# Patient Record
Sex: FEMALE | Race: WHITE | Employment: OTHER | URBAN - METROPOLITAN AREA
[De-identification: names, ages, dates, MRNs, and addresses within clinical notes are randomized per-mention and may not be internally consistent; named-entity substitution may affect disease eponyms.]

---

## 2017-03-23 ENCOUNTER — TELEPHONE (OUTPATIENT)
Dept: INTERNAL MEDICINE CLINIC | Age: 77
End: 2017-03-23

## 2017-03-23 ENCOUNTER — OFFICE VISIT (OUTPATIENT)
Dept: INTERNAL MEDICINE CLINIC | Age: 77
End: 2017-03-23

## 2017-03-23 VITALS
HEART RATE: 97 BPM | WEIGHT: 179.2 LBS | SYSTOLIC BLOOD PRESSURE: 128 MMHG | BODY MASS INDEX: 31.75 KG/M2 | TEMPERATURE: 98.1 F | RESPIRATION RATE: 18 BRPM | DIASTOLIC BLOOD PRESSURE: 81 MMHG | OXYGEN SATURATION: 94 % | HEIGHT: 63 IN

## 2017-03-23 DIAGNOSIS — H46.9 OPTIC NEUROPATHY, RIGHT: ICD-10-CM

## 2017-03-23 DIAGNOSIS — E03.9 ACQUIRED HYPOTHYROIDISM: ICD-10-CM

## 2017-03-23 DIAGNOSIS — I10 ESSENTIAL HYPERTENSION: Primary | ICD-10-CM

## 2017-03-23 DIAGNOSIS — M85.80 OSTEOPENIA: ICD-10-CM

## 2017-03-23 DIAGNOSIS — L98.9 SKIN LESIONS: ICD-10-CM

## 2017-03-23 DIAGNOSIS — E78.00 PURE HYPERCHOLESTEROLEMIA: ICD-10-CM

## 2017-03-23 DIAGNOSIS — R73.09 ELEVATED GLUCOSE: ICD-10-CM

## 2017-03-23 DIAGNOSIS — Z78.0 POSTMENOPAUSAL STATE: ICD-10-CM

## 2017-03-23 RX ORDER — GUAIFENESIN 100 MG/5ML
81 LIQUID (ML) ORAL DAILY
COMMUNITY

## 2017-03-23 RX ORDER — PERPHENAZINE 16 MG
TABLET ORAL
COMMUNITY
End: 2018-05-21

## 2017-03-23 RX ORDER — LANOLIN ALCOHOL/MO/W.PET/CERES
400 CREAM (GRAM) TOPICAL DAILY
COMMUNITY

## 2017-03-23 RX ORDER — SERTRALINE HYDROCHLORIDE 100 MG/1
TABLET, FILM COATED ORAL DAILY
COMMUNITY
End: 2017-03-23 | Stop reason: SDUPTHER

## 2017-03-23 RX ORDER — HYDRALAZINE HYDROCHLORIDE 25 MG/1
25 TABLET, FILM COATED ORAL 3 TIMES DAILY
Qty: 270 TAB | Refills: 3 | Status: SHIPPED | OUTPATIENT
Start: 2017-03-23 | End: 2018-04-02 | Stop reason: SDUPTHER

## 2017-03-23 RX ORDER — LORAZEPAM 1 MG/1
TABLET ORAL
Qty: 90 TAB | Refills: 0 | Status: SHIPPED | OUTPATIENT
Start: 2017-03-23 | End: 2017-07-11 | Stop reason: SDUPTHER

## 2017-03-23 RX ORDER — LOSARTAN POTASSIUM AND HYDROCHLOROTHIAZIDE 25; 100 MG/1; MG/1
1 TABLET ORAL DAILY
COMMUNITY
End: 2017-03-23 | Stop reason: SDUPTHER

## 2017-03-23 RX ORDER — ASPIRIN 81 MG
TABLET, DELAYED RELEASE (ENTERIC COATED) ORAL
COMMUNITY

## 2017-03-23 RX ORDER — ATORVASTATIN CALCIUM 40 MG/1
TABLET, FILM COATED ORAL DAILY
COMMUNITY
End: 2017-03-23 | Stop reason: SDUPTHER

## 2017-03-23 RX ORDER — PANTOPRAZOLE SODIUM 40 MG/1
40 TABLET, DELAYED RELEASE ORAL DAILY
Qty: 90 TAB | Refills: 3 | Status: SHIPPED | OUTPATIENT
Start: 2017-03-23 | End: 2018-07-04 | Stop reason: SDUPTHER

## 2017-03-23 RX ORDER — LEVOTHYROXINE SODIUM 112 UG/1
112 TABLET ORAL
Qty: 90 TAB | Refills: 3 | Status: SHIPPED | OUTPATIENT
Start: 2017-03-23 | End: 2018-02-26 | Stop reason: SDUPTHER

## 2017-03-23 RX ORDER — HYDRALAZINE HYDROCHLORIDE 25 MG/1
25 TABLET, FILM COATED ORAL 3 TIMES DAILY
COMMUNITY
End: 2017-03-23 | Stop reason: SDUPTHER

## 2017-03-23 RX ORDER — PANTOPRAZOLE SODIUM 40 MG/1
40 TABLET, DELAYED RELEASE ORAL DAILY
COMMUNITY
End: 2017-03-23 | Stop reason: SDUPTHER

## 2017-03-23 RX ORDER — LANOLIN ALCOHOL/MO/W.PET/CERES
1000 CREAM (GRAM) TOPICAL DAILY
COMMUNITY

## 2017-03-23 RX ORDER — LORAZEPAM 1 MG/1
1 TABLET ORAL
COMMUNITY
Start: 2017-01-12 | End: 2017-03-23 | Stop reason: SDUPTHER

## 2017-03-23 RX ORDER — LEVOTHYROXINE SODIUM 112 UG/1
TABLET ORAL
COMMUNITY
Start: 2017-02-28 | End: 2017-03-23 | Stop reason: SDUPTHER

## 2017-03-23 RX ORDER — TRAMADOL HYDROCHLORIDE 50 MG/1
50 TABLET ORAL
COMMUNITY
End: 2018-05-21

## 2017-03-23 RX ORDER — LOSARTAN POTASSIUM AND HYDROCHLOROTHIAZIDE 25; 100 MG/1; MG/1
1 TABLET ORAL DAILY
Qty: 90 TAB | Refills: 3 | Status: SHIPPED | OUTPATIENT
Start: 2017-03-23 | End: 2018-04-02 | Stop reason: SDUPTHER

## 2017-03-23 RX ORDER — DIPHENHYDRAMINE HCL 25 MG
25 TABLET ORAL
COMMUNITY
End: 2018-11-14

## 2017-03-23 RX ORDER — SERTRALINE HYDROCHLORIDE 100 MG/1
100 TABLET, FILM COATED ORAL DAILY
Qty: 90 TAB | Refills: 3 | Status: SHIPPED | OUTPATIENT
Start: 2017-03-23 | End: 2018-04-02 | Stop reason: SDUPTHER

## 2017-03-23 RX ORDER — ATORVASTATIN CALCIUM 40 MG/1
40 TABLET, FILM COATED ORAL DAILY
Qty: 90 TAB | Refills: 3 | Status: SHIPPED | OUTPATIENT
Start: 2017-03-23 | End: 2018-04-02 | Stop reason: SDUPTHER

## 2017-03-23 NOTE — TELEPHONE ENCOUNTER
Pt would like a return  Phone call from the nurse or a patient  regarding medical records that were going to sent from another physician. Pt wanted to make sure the records were received. Pt can be reached at [phone].        Message received & copied from [facility]

## 2017-03-23 NOTE — ACP (ADVANCE CARE PLANNING)
Advance Care Planning 3/23/2017   Patient's Healthcare Decision Maker is: Legal Next of Kin   Primary Decision Maker Name Dr. Oliverio Main   Primary Decision Maker Phone Number [phone]   Primary Decision Maker Relationship to Patient Adult child   Secondary Decision Maker Name Argriselda Portillo   Secondary Decision Maker Phone Number [phone]   Secondary Decision Maker Relationship to Patient Adult child   Confirm Advance Directive Yes, not on file   Does the patient have other document types Power of [address]

## 2017-03-23 NOTE — PROGRESS NOTES
HPI: Guillermina Fall is a 77 y.o. female presents to establish. 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 Labs November 2016. LDL at goal.  On lipitor 40mg once a day, no myalgias. On sertraline for anxiety and OCD for over 20 years. Taking lorazepam 1mg at bedctime with benadryl 25mg, for the past 5 years. Taking hydralazine 25mg TID and losartan/HCTZ 100/25mg for HTN. BP controlled. Left shoulder pain, 12 years ago injured shoulder and saw ortho. Diagnosed with bursitis. The pain is down left bicep. Prior PT, not helpful. Is in Silver Sneakers, cautious with weights. Taking tramadol infrequently. Occasion Aleve for pain. Taking protonix daily. Has GERD. Occasionally takes second. Trying to follow bland diet. Taking thyroid medication. Prior ablation. Stable weight. Reports xrays show multiple rib fractures, prior bone scans. Some chest wall pain with movement. Thinks it was related to prior deep tissue massage. Prior DEXA, 3 years ago. Will get MORRIS. Mother MI age 41\"s. Prior cardiac workup negative. Prior colon screening, 2/4/2013. Normal except diverticular disease and tortous colon recheck 5 years. GM with colon cancer. Some constipation, Magnesium and stool softener routinely. Normal urination except stress incontinence. No DUB, last pap was 2 years ago. Due pap in November. Mammogram in January 2017. Right breast biospy in 2016. Up to date on immunization.         ROS:  Constitutional: negative for fevers, chills, anorexia, weight loss  Eyes:   negative for visual disturbance, irritation  ENT:   negative for tinnitus,sore throat,nasal congestion,ear pain,  Respiratory:  negative for cough, hemoptysis, dyspnea,wheezing  CV:   negative for chest pain, palpitations, lower extremity edema  GI:   negative for nausea, vomiting, diarrhea, abdominal pain,melena  Endo:               negative for polyuria,polydipsia,polyphagia,heat intolerance  Genitourinary: negative for frequency, dysuria, hematuria  Integument:  negative for rash, pruritus  Hematologic:  negative for easy bruising and gum/nose bleeding  Musculoskel: negative for  back pain, muscle weakness, positive for left shoulder pain, chest wall pain  Neurological:  negative for headaches, dizziness, gait problems, numbness  Behavl/Psych: negative for depression, positive for anxiety    Past Medical History:   Diagnosis Date    Anxiety     Chronic pain of both shoulders     Diverticulosis     GERD (gastroesophageal reflux disease)     H/O tubal ligation     Hyperlipidemia     Hypertension     Hypothyroidism     Prediabetes     Vitamin D deficiency      Past Surgical History:   Procedure Laterality Date    HX APPENDECTOMY      HX DILATION AND CURETTAGE  1971-1976    x 3 due to MAB    HX HEENT      HX HEMORRHOIDECTOMY      banded    HX INTRACRANIAL ANEURYSM REPAIR  11/1997    brain aneurysm clipping, [facility]    HX TONSIL AND ADENOIDECTOMY       Social History     Social History    Marital status:      Spouse name: N/A    Number of children: N/A    Years of education: N/A     Social History Main Topics    Smoking status: Former Smoker     Packs/day: 1.00     Years: 30.00     Quit date: 3/23/2007    Smokeless tobacco: None    Alcohol use 4.2 oz/week     7 Glasses of wine per week    Drug use: No    Sexual activity: Not Asked     Other Topics Concern    None     Social History Narrative    None     Family History   Problem Relation Age of Onset    Heart Disease Mother     Stroke Mother     Hypertension Mother     Diabetes Mother     Elevated Lipids Mother     Diabetes Father     Heart Disease Father     Hypertension Father     Elevated Lipids Father     Cancer Sister 36     breast cancer    Sleep Apnea Sister     Elevated Lipids Sister     Heart Surgery Brother     Heart Disease Brother     Cancer Maternal Grandmother      colon cancer    Stroke Maternal Grandfather     Diabetes Paternal Grandmother     Cancer Paternal Grandfather      pancreatic cancer     Current Outpatient Prescriptions   Medication Sig Dispense Refill    aspirin 81 mg chewable tablet Take 81 mg by mouth daily.  diphenhydrAMINE (BENADRYL ALLERGY) 25 mg tablet Take 25 mg by mouth nightly.  docusate sodium (STOOL SOFTENER) 100 mg tab Take  by mouth. 2 tabs PO daily      magnesium oxide (MAG-OX) 400 mg tablet Take 400 mg by mouth daily.  Alpha Lipoic Acid 600 mg cap Take  by mouth.  traMADol (ULTRAM) 50 mg tablet Take 50 mg by mouth every six (6) hours as needed for Pain.  cyanocobalamin (VITAMIN B-12) 1,000 mcg tablet Take 1,000 mcg by mouth daily.  atorvastatin (LIPITOR) 40 mg tablet Take 1 Tab by mouth daily. 90 Tab 3    hydrALAZINE (APRESOLINE) 25 mg tablet Take 1 Tab by mouth three (3) times daily. 270 Tab 3    levothyroxine (SYNTHROID) 112 mcg tablet Take 1 Tab by mouth Daily (before breakfast). 90 Tab 3    LORazepam (ATIVAN) 1 mg tablet Take one tablet at bedtime. 90 Tab 0    losartan-hydroCHLOROthiazide (HYZAAR) 100-25 mg per tablet Take 1 Tab by mouth daily. 90 Tab 3    sertraline (ZOLOFT) 100 mg tablet Take 1 Tab by mouth daily. 90 Tab 3    pantoprazole (PROTONIX) 40 mg tablet Take 1 Tab by mouth daily.  90 Tab 3     Allergies   Allergen Reactions    Adhesive Tape-Silicones Rash and Itching    Percocet [Oxycodone-Acetaminophen] Other (comments)     agitation         Physical exam:  Visit Vitals    /81 (BP 1 Location: Left arm, BP Patient Position: Sitting)    Pulse 97    Temp 98.1 °F (36.7 °C) (Oral)    Resp 18    Ht 5' 3\" (1.6 m)    Wt 179 lb 3.2 oz (81.3 kg)    SpO2 94%    BMI 31.74 kg/m2     General appearance - alert, well appearing, and in no distress  HEENT- PERLL,normal conjunctiva, TM normal bilaterally, mucous membranes moist, pharynx normal without lesions  Neck - supple, no significant adenopathy   Pulm- clear to auscultation, no wheezes, rales or rhonchi  CV- normal rate, regular rhythm, normal S1, S2, no murmurs   Abdomen - soft, nontender, nondistended, no masses or organomegaly  Extrem-peripheral pulses normal, no pedal edema  Neuro -alert, oriented,nonfocal    Assessment/Plan:    1. Optic neuropathy, right    - REFERRAL TO OPHTHALMOLOGY    2. Skin lesions    - REFERRAL TO DERMATOLOGY    3. Essential hypertension- Recommend following a lower sodium diet and regular cardiovascular exercise. Blood pressure goal is less than 130/85 on average. Advised compliance with blood pressure medication and regular follow up. - METABOLIC PANEL, COMPREHENSIVE; Future  - CBC W/O DIFF; Future  - hydrALAZINE (APRESOLINE) 25 mg tablet; Take 1 Tab by mouth three (3) times daily. Dispense: 270 Tab; Refill: 3  - losartan-hydroCHLOROthiazide (HYZAAR) 100-25 mg per tablet; Take 1 Tab by mouth daily. Dispense: 90 Tab; Refill: 3    4. Pure hypercholesterolemia- Recommend AHA diet and regular cardiovascular exercise. Continue statin therapy. - LIPID PANEL; Future  - atorvastatin (LIPITOR) 40 mg tablet; Take 1 Tab by mouth daily. Dispense: 90 Tab; Refill: 3    5. Acquired hypothyroidism- Recommend taking thyroid medication daily on empty stomach and regular follow up.    - TSH 3RD GENERATION; Future  - levothyroxine (SYNTHROID) 112 mcg tablet; Take 1 Tab by mouth Daily (before breakfast). Dispense: 90 Tab; Refill: 3    6. Osteopenia    - DEXA BONE DENSITY STUDY AXIAL; Future    7. Postmenopausal state    - DEXA BONE DENSITY STUDY AXIAL; Future    8. Elevated glucose    - HEMOGLOBIN A1C W/O EAG; Future    Follow-up Disposition:  Return for follow up for annual with pap, Nov 2017.  Nanette Alexander MD

## 2017-03-23 NOTE — TELEPHONE ENCOUNTER
Spoke with patient. Advised I do not see them scanned in her chart already. Will call medical records tomorrow morning to see if they have received her records from [facility]. Patient satisfied with response.

## 2017-03-23 NOTE — MR AVS SNAPSHOT
Visit Information Date & Time Provider Department Dept. Phone Encounter #  
 3/23/2017  1:30 PM Collette Richter, [address] [place] [address],[address] [phone] [id number] Follow-up Instructions Return for follow up for annual with pap, Nov 2017. Jerome Jenkins Upcoming Health Maintenance Date Due DTaP/Tdap/Td series (1 - Tdap) 10/16/1962 ZOSTER VACCINE AGE 60> 10/16/2001 GLAUCOMA SCREENING Q2Y 10/16/2006 OSTEOPOROSIS SCREENING (DEXA) 10/16/2006 Pneumococcal 65+ Low/Medium Risk (1 of 2 - PCV13) 10/16/2006 MEDICARE YEARLY EXAM 10/16/2006 INFLUENZA AGE 9 TO ADULT 8/1/2016 Allergies as of 3/23/2017  Review Complete On: 3/23/2017 By: Collette Richter MD  
  
 Severity Noted Reaction Type Reactions Adhesive Tape-silicones  23/93/0040    Rash, Itching Percocet [Oxycodone-acetaminophen]  03/23/2017    Other (comments) agitation Current Immunizations  Reviewed on 3/23/2017 Name Date Pneumococcal Conjugate (PCV-13) 1/1/2013 Tdap 10/21/2011 Zoster Vaccine, Live 1/1/2011 Reviewed by Collette Richter MD on 3/23/2017 at  2:27 PM  
You Were Diagnosed With   
  
 Codes Comments Essential hypertension    -  Primary ICD-10-CM: I10 
ICD-9-CM: 401.9 Optic neuropathy, right     ICD-10-CM: H46.9 ICD-9-CM: 377.39 Skin lesions     ICD-10-CM: L98.9 ICD-9-CM: 709.9 Pure hypercholesterolemia     ICD-10-CM: E78.00 ICD-9-CM: 272.0 Acquired hypothyroidism     ICD-10-CM: E03.9 ICD-9-CM: 244.9 Osteopenia     ICD-10-CM: M85.80 ICD-9-CM: 733.90 Postmenopausal state     ICD-10-CM: Z78.0 ICD-9-CM: V49.81 Elevated glucose     ICD-10-CM: R73.09 
ICD-9-CM: 790.29 Vitals BP Pulse Temp Resp Height(growth percentile) Weight(growth percentile) 128/81 (BP 1 Location: Left arm, BP Patient Position: Sitting) 97 98.1 °F (36.7 °C) (Oral) 18 5' 3\" (1.6 m) 179 lb 3.2 oz (81.3 kg) SpO2 BMI OB Status Smoking Status 94% 31.74 kg/m2 Postmenopausal Former Smoker Vitals History BMI and BSA Data Body Mass Index Body Surface Area 31.74 kg/m 2 1.9 m 2 Your Updated Medication List  
  
   
This list is accurate as of: 3/23/17  2:48 PM.  Always use your most recent med list.  
  
  
  
  
 Alpha Lipoic Acid 600 mg Cap Take  by mouth. aspirin 81 mg chewable tablet Take 81 mg by mouth daily. atorvastatin 40 mg tablet Commonly known as:  LIPITOR Take 1 Tab by mouth daily. BENADRYL ALLERGY 25 mg tablet Generic drug:  diphenhydrAMINE Take 25 mg by mouth nightly. hydrALAZINE 25 mg tablet Commonly known as:  APRESOLINE Take 1 Tab by mouth three (3) times daily. levothyroxine 112 mcg tablet Commonly known as:  SYNTHROID Take 1 Tab by mouth Daily (before breakfast). LORazepam 1 mg tablet Commonly known as:  ATIVAN Take one tablet at bedtime. losartan-hydroCHLOROthiazide 100-25 mg per tablet Commonly known as:  HYZAAR Take 1 Tab by mouth daily. magnesium oxide 400 mg tablet Commonly known as:  MAG-OX Take 400 mg by mouth daily. pantoprazole 40 mg tablet Commonly known as:  PROTONIX Take 1 Tab by mouth daily. sertraline 100 mg tablet Commonly known as:  ZOLOFT Take 1 Tab by mouth daily. STOOL SOFTENER 100 mg Tab Generic drug:  docusate sodium Take  by mouth. 2 tabs PO daily  
  
 traMADol 50 mg tablet Commonly known as:  ULTRAM  
Take 50 mg by mouth every six (6) hours as needed for Pain. VITAMIN B-12 1,000 mcg tablet Generic drug:  cyanocobalamin Take 1,000 mcg by mouth daily. Prescriptions Printed Refills  
 atorvastatin (LIPITOR) 40 mg tablet 3 Sig: Take 1 Tab by mouth daily. Class: Print Route: Oral  
 hydrALAZINE (APRESOLINE) 25 mg tablet 3 Sig: Take 1 Tab by mouth three (3) times daily. Class: Print  Route: Oral  
 levothyroxine (SYNTHROID) 112 mcg tablet 3 Sig: Take 1 Tab by mouth Daily (before breakfast). Class: Print Route: Oral  
 LORazepam (ATIVAN) 1 mg tablet 0 Sig: Take one tablet at bedtime. Class: Print  
 losartan-hydroCHLOROthiazide (HYZAAR) 100-25 mg per tablet 3 Sig: Take 1 Tab by mouth daily. Class: Print Route: Oral  
 sertraline (ZOLOFT) 100 mg tablet 3 Sig: Take 1 Tab by mouth daily. Class: Print Route: Oral  
 pantoprazole (PROTONIX) 40 mg tablet 3 Sig: Take 1 Tab by mouth daily. Class: Print Route: Oral  
  
We Performed the Following REFERRAL TO DERMATOLOGY [REF19 Custom] Comments:  
 Please evaluate patient for skin cancer REFERRAL TO OPHTHALMOLOGY [REF57 Custom] Comments:  
 Cataracts, history of optic neuropathy right eye, prior aneurysm. Follow-up Instructions Return for follow up for annual with pap, Nov 2017. Keith Schreiber To-Do List   
 03/23/2017 Lab:  CBC W/O DIFF   
  
 03/23/2017 Imaging:  DEXA BONE DENSITY STUDY AXIAL   
  
 03/23/2017 Lab:  HEMOGLOBIN A1C W/O EAG   
  
 03/23/2017 Lab:  LIPID PANEL   
  
 03/23/2017 Lab:  METABOLIC PANEL, COMPREHENSIVE   
  
 03/23/2017 Lab:  TSH 3RD GENERATION Referral Information Referral ID Referred By Referred To  
  
 [id number] Hardin Michael [facility] [address] [facility], [address] Visits Status Start Date End Date 1 Authorized 3/23/17 3/23/18 If your referral has a status of pending review or denied, additional information will be sent to support the outcome of this decision. Referral ID Referred By Referred To  
 [id number] Castro Severino MD  
   [address] [facility] [facility] Phone: [phone] Fax: [phone] Visits Status Start Date End Date 1 Authorized 3/23/17 3/23/18  If your referral has a status of pending review or denied, additional information will be sent to support the outcome of this decision. Introducing [facility]! [employer] introduces [employer] patient portal. Now you can access parts of your medical record, email your doctor's office, and request medication refills online. 1. In your internet browser, go to [email or web address] 2. Click on the First Time User? Click Here link in the Sign In box. You will see the New Member Sign Up page. 3. Enter your [employer] Access Code exactly as it appears below. You will not need to use this code after youve completed the sign-up process. If you do not sign up before the expiration date, you must request a new code. · [employer] Access Code: [email or web address] Expires: 6/21/2017  1:21 PM 
 
4. Enter the last four digits of your Social Security Number (xxxx) and Date of Birth (mm/dd/yyyy) as indicated and click Submit. You will be taken to the next sign-up page. 5. Create a [employer] ID. This will be your [employer] login ID and cannot be changed, so think of one that is secure and easy to remember. 6. Create a [employer] password. You can change your password at any time. 7. Enter your Password Reset Question and Answer. This can be used at a later time if you forget your password. 8. Enter your e-mail address. You will receive e-mail notification when new information is available in [address] 9. Click Sign Up. You can now view and download portions of your medical record. 10. Click the Download Summary menu link to download a portable copy of your medical information. If you have questions, please visit the Frequently Asked Questions section of the [employer] website. Remember, [employer] is NOT to be used for urgent needs. For medical emergencies, dial 911. Now available from your iPhone and Android! Please provide this summary of care documentation to your next provider. Your primary care clinician is listed as Gopal Mccall.  If you have any questions after today's visit, please call [phone].

## 2017-03-29 ENCOUNTER — HOSPITAL ENCOUNTER (OUTPATIENT)
Dept: MAMMOGRAPHY | Age: 77
Discharge: HOME | End: 2017-03-29
Attending: FAMILY MEDICINE
Payer: MEDICARE

## 2017-03-29 DIAGNOSIS — M85.80 OSTEOPENIA: ICD-10-CM

## 2017-03-29 DIAGNOSIS — Z78.0 POSTMENOPAUSAL STATE: ICD-10-CM

## 2017-03-29 PROCEDURE — 77080 DXA BONE DENSITY AXIAL: CPT

## 2017-03-30 ENCOUNTER — TELEPHONE (OUTPATIENT)
Dept: INTERNAL MEDICINE CLINIC | Age: 77
End: 2017-03-30

## 2017-03-31 NOTE — TELEPHONE ENCOUNTER
Spoke with patient. Records are pending review from Dr. Cristine Francis and will be scanned after she reviews them.

## 2017-04-04 NOTE — PROGRESS NOTES
Bone density testing shows osteopenia. Recommend weight bearing exercise, like walking. Calcium 1200mg daily, most from foods, less from supplement. Vitamin D3 2,000 iu once a day. Recheck bone density test in 3 years.

## 2017-04-05 NOTE — PROGRESS NOTES
Reviewed results with patient per Dr. Jordan Bell. I have reviewed the provider's instructions with the patient, answering all questions to her satisfaction.

## 2017-07-11 RX ORDER — LORAZEPAM 1 MG/1
TABLET ORAL
Qty: 90 TAB | Refills: 0 | OUTPATIENT
Start: 2017-07-11 | End: 2017-10-12 | Stop reason: SDUPTHER

## 2017-07-12 NOTE — TELEPHONE ENCOUNTER
Approved Medications  LORazepam (ATIVAN) 1 mg tablet  Take one tablet at bedtime. Disp: 90 Tab Refills: 0    Class: Phone In Start: 7/11/2017   Approved by: Nic Loera MD  Phoned in Ativan 1 mg to [address] per Dr. Canela Clock request. Steven Mesa with Jaylan Adam who gave David Miller.

## 2017-10-13 RX ORDER — LORAZEPAM 1 MG/1
TABLET ORAL
Qty: 90 TAB | Refills: 0 | OUTPATIENT
Start: 2017-10-13 | End: 2018-01-13 | Stop reason: SDUPTHER

## 2017-10-16 NOTE — TELEPHONE ENCOUNTER
Pt states that [facility] did not get script for Lorazepam please call that in again. Thanks.

## 2017-10-16 NOTE — TELEPHONE ENCOUNTER
Approved Medications  LORazepam (ATIVAN) 1 mg tablet  Take one tablet at bedtime.        Disp: 90 Tab Refills: 0    Class: Phone In Start: 10/13/2017   Approved by: Cristine Philip MD  Phoned in Ativan to [facility] per  request. [facility]

## 2017-11-13 ENCOUNTER — HOSPITAL ENCOUNTER (OUTPATIENT)
Dept: LAB | Age: 77
Discharge: HOME | End: 2017-11-13
Payer: MEDICARE

## 2017-11-13 ENCOUNTER — LAB ONLY (OUTPATIENT)
Dept: INTERNAL MEDICINE CLINIC | Age: 77
End: 2017-11-13

## 2017-11-13 DIAGNOSIS — I10 ESSENTIAL HYPERTENSION: Primary | ICD-10-CM

## 2017-11-13 DIAGNOSIS — E03.9 ACQUIRED HYPOTHYROIDISM: ICD-10-CM

## 2017-11-13 DIAGNOSIS — E78.00 PURE HYPERCHOLESTEROLEMIA: ICD-10-CM

## 2017-11-13 DIAGNOSIS — R73.09 ELEVATED GLUCOSE: ICD-10-CM

## 2017-11-13 PROCEDURE — 83036 HEMOGLOBIN GLYCOSYLATED A1C: CPT

## 2017-11-13 PROCEDURE — 36415 COLL VENOUS BLD VENIPUNCTURE: CPT

## 2017-11-13 PROCEDURE — 85025 COMPLETE CBC W/AUTO DIFF WBC: CPT

## 2017-11-13 PROCEDURE — 80061 LIPID PANEL: CPT

## 2017-11-13 PROCEDURE — 80053 COMPREHEN METABOLIC PANEL: CPT

## 2017-11-13 PROCEDURE — 84443 ASSAY THYROID STIM HORMONE: CPT

## 2017-11-14 LAB
ALBUMIN SERPL-MCNC: 4.5 G/DL (ref 3.5–4.8)
ALBUMIN/GLOB SERPL: 2.1 {RATIO} (ref 1.2–2.2)
ALP SERPL-CCNC: 70 IU/L (ref 39–117)
ALT SERPL-CCNC: 26 IU/L (ref 0–32)
AST SERPL-CCNC: 25 IU/L (ref 0–40)
BASOPHILS # BLD AUTO: 0 X10E3/UL (ref 0–0.2)
BASOPHILS NFR BLD AUTO: 1 %
BILIRUB SERPL-MCNC: 0.6 MG/DL (ref 0–1.2)
BUN SERPL-MCNC: 16 MG/DL (ref 8–27)
BUN/CREAT SERPL: 15 (ref 12–28)
CALCIUM SERPL-MCNC: 9.8 MG/DL (ref 8.7–10.3)
CHLORIDE SERPL-SCNC: 100 MMOL/L (ref 96–106)
CHOLEST SERPL-MCNC: 169 MG/DL (ref 100–199)
CO2 SERPL-SCNC: 24 MMOL/L (ref 18–29)
CREAT SERPL-MCNC: 1.04 MG/DL (ref 0.57–1)
EOSINOPHIL # BLD AUTO: 0.2 X10E3/UL (ref 0–0.4)
EOSINOPHIL NFR BLD AUTO: 4 %
ERYTHROCYTE [DISTWIDTH] IN BLOOD BY AUTOMATED COUNT: 13.4 % (ref 12.3–15.4)
EST. AVERAGE GLUCOSE BLD GHB EST-MCNC: 126 MG/DL
GFR SERPLBLD CREATININE-BSD FMLA CKD-EPI: 52 ML/MIN/1.73
GFR SERPLBLD CREATININE-BSD FMLA CKD-EPI: 60 ML/MIN/1.73
GLOBULIN SER CALC-MCNC: 2.1 G/DL (ref 1.5–4.5)
GLUCOSE SERPL-MCNC: 123 MG/DL (ref 65–99)
HBA1C MFR BLD: 6 % (ref 4.8–5.6)
HCT VFR BLD AUTO: 40.6 % (ref 34–46.6)
HDLC SERPL-MCNC: 56 MG/DL
HGB BLD-MCNC: 13.8 G/DL (ref 11.1–15.9)
IMM GRANULOCYTES # BLD: 0 X10E3/UL (ref 0–0.1)
IMM GRANULOCYTES NFR BLD: 0 %
LDLC SERPL CALC-MCNC: 82 MG/DL (ref 0–99)
LDLC/HDLC SERPL: 1.5 RATIO UNITS (ref 0–3.2)
LYMPHOCYTES # BLD AUTO: 2.1 X10E3/UL (ref 0.7–3.1)
LYMPHOCYTES NFR BLD AUTO: 42 %
MCH RBC QN AUTO: 32.5 PG (ref 26.6–33)
MCHC RBC AUTO-ENTMCNC: 34 G/DL (ref 31.5–35.7)
MCV RBC AUTO: 96 FL (ref 79–97)
MONOCYTES # BLD AUTO: 0.4 X10E3/UL (ref 0.1–0.9)
MONOCYTES NFR BLD AUTO: 9 %
NEUTROPHILS # BLD AUTO: 2.3 X10E3/UL (ref 1.4–7)
NEUTROPHILS NFR BLD AUTO: 44 %
PLATELET # BLD AUTO: 286 X10E3/UL (ref 150–379)
POTASSIUM SERPL-SCNC: 4.1 MMOL/L (ref 3.5–5.2)
PROT SERPL-MCNC: 6.6 G/DL (ref 6–8.5)
RBC # BLD AUTO: 4.24 X10E6/UL (ref 3.77–5.28)
SODIUM SERPL-SCNC: 142 MMOL/L (ref 134–144)
TRIGL SERPL-MCNC: 156 MG/DL (ref 0–149)
TSH SERPL DL<=0.005 MIU/L-ACNC: 2.67 UIU/ML (ref 0.45–4.5)
VLDLC SERPL CALC-MCNC: 31 MG/DL (ref 5–40)
WBC # BLD AUTO: 5.1 X10E3/UL (ref 3.4–10.8)

## 2017-11-20 ENCOUNTER — HOSPITAL ENCOUNTER (OUTPATIENT)
Dept: LAB | Age: 77
Discharge: HOME | End: 2017-11-20
Payer: MEDICARE

## 2017-11-20 ENCOUNTER — OFFICE VISIT (OUTPATIENT)
Dept: INTERNAL MEDICINE CLINIC | Age: 77
End: 2017-11-20

## 2017-11-20 VITALS
DIASTOLIC BLOOD PRESSURE: 77 MMHG | WEIGHT: 174 LBS | BODY MASS INDEX: 30.83 KG/M2 | RESPIRATION RATE: 16 BRPM | TEMPERATURE: 97.8 F | HEIGHT: 63 IN | OXYGEN SATURATION: 95 % | SYSTOLIC BLOOD PRESSURE: 135 MMHG | HEART RATE: 85 BPM

## 2017-11-20 DIAGNOSIS — Z12.31 ENCOUNTER FOR SCREENING MAMMOGRAM FOR BREAST CANCER: ICD-10-CM

## 2017-11-20 DIAGNOSIS — M85.80 OSTEOPENIA, UNSPECIFIED LOCATION: ICD-10-CM

## 2017-11-20 DIAGNOSIS — E03.9 ACQUIRED HYPOTHYROIDISM: ICD-10-CM

## 2017-11-20 DIAGNOSIS — Z12.4 SCREENING FOR CERVICAL CANCER: Primary | ICD-10-CM

## 2017-11-20 DIAGNOSIS — K21.9 GASTROESOPHAGEAL REFLUX DISEASE WITHOUT ESOPHAGITIS: ICD-10-CM

## 2017-11-20 DIAGNOSIS — E78.00 PURE HYPERCHOLESTEROLEMIA: ICD-10-CM

## 2017-11-20 DIAGNOSIS — I10 ESSENTIAL HYPERTENSION: ICD-10-CM

## 2017-11-20 PROCEDURE — 88175 CYTOPATH C/V AUTO FLUID REDO: CPT | Performed by: FAMILY MEDICINE

## 2017-11-20 RX ORDER — TRIAMCINOLONE ACETONIDE 1 MG/G
OINTMENT TOPICAL
COMMUNITY
Start: 2017-11-13 | End: 2020-02-14

## 2017-11-20 NOTE — PROGRESS NOTES
She is a 69 y.o. female who presents for annual and pap. Normal urination except stress incontinence. No DUB, last pap was 2 years ago. Mammogram in January 2017. Right breast biospy in 2016. Due in January. Up to date on immunization.       Labs normal this month. LDL at goal.  On lipitor 40mg once a day, no myalgias. She had shoulder pain when last seen. Better with exercise at [employer]. No sx with exertion. Prior cardiac workup for MORRIS, negative. Weight loss 5#.      Saw Dr. Ashley Martínez, diagnosed with lichen simplex right lower leg itching. Given topical.       On sertraline for anxiety and OCD for over 20 years. Taking lorazepam 1mg at bedctime with benadryl 25mg, for the past 5 years.      Taking hydralazine 25mg TID and losartan/HCTZ 100/25mg for HTN. BP controlled.      Has been on protonix, is concerned about side effects. Skips doses and will do okay. Has GERD     Taking thyroid medication. Prior ablation. Stable weight.       DEXA scan with osteopenia. Dietary calcium and taking vitamin D 5k.      Prior colon screening, 2/4/2013. Normal except diverticular disease and tortous colon recheck 5 years. GM with colon cancer. Some constipation, Magnesium and stool softener routinely. ROS:  Constitutional: negative for fevers, chills, anorexia and weight loss  Eyes:   negative for visual disturbance and irritation  ENT:   negative for tinnitus,sore throat,nasal congestion,ear pain,hoarseness  Respiratory:  negative for cough, hemoptysis, dyspnea,wheezing  CV:   negative for chest pain, palpitations, lower extremity edema  GI:   negative for nausea, vomiting, diarrhea, abdominal pain,melena  Genitourinary: negative for frequency, dysuria and hematuria, vaginal discharge, lesions  Musculoskel: negative for myalgias, arthralgias, back pain, muscle weakness, joint pain  Neurological:  negative for headaches, dizziness, focal weakness, numbness  Skin:   Positive for right leg rash. Past Medical History:   Diagnosis Date    Anxiety     Chronic pain of both shoulders     Diverticulosis     GERD (gastroesophageal reflux disease)     H/O tubal ligation     Hyperlipidemia     Hypertension     Hypothyroidism     Prediabetes     Vitamin D deficiency        Past Surgical History:   Procedure Laterality Date    HX APPENDECTOMY      HX BREAST AUGMENTATION  01/1993    HX BREAST BIOPSY Bilateral     right Breast 1 year and left breast 4 years ago.  HX DILATION AND CURETTAGE  1971-1976    x 3 due to MAB    HX HEENT      HX HEMORRHOIDECTOMY      banded    HX INTRACRANIAL ANEURYSM REPAIR  11/1997    [facility], [facility]    HX TONSIL AND ADENOIDECTOMY         Family History   Problem Relation Age of Onset    Heart Disease Mother     Stroke Mother     Hypertension Mother     Diabetes Mother     Elevated Lipids Mother     Diabetes Father     Heart Disease Father     Hypertension Father     Elevated Lipids Father     Cancer Sister 36     breast cancer    Sleep Apnea Sister     Elevated Lipids Sister     Heart Surgery Brother     Heart Disease Brother     Cancer Maternal Grandmother      colon cancer    Stroke Maternal Grandfather     Diabetes Paternal Grandmother     Cancer Paternal Grandfather      pancreatic cancer       Social History     Social History    Marital status:      Spouse name: N/A    Number of children: N/A    Years of education: N/A     Occupational History    Not on file.      Social History Main Topics    Smoking status: Former Smoker     Packs/day: 1.00     Years: 30.00     Quit date: 3/23/2007    Smokeless tobacco: Never Used    Alcohol use 0.6 oz/week     1 Glasses of wine per week      Comment: Once a day    Drug use: No    Sexual activity: No     Other Topics Concern    Not on file     Social History Narrative            Visit Vitals    /77 (BP 1 Location: Left arm, BP Patient Position: Sitting)    Pulse 85    Temp 97.8 °F (36.6 °C) (Oral)    Resp 16    Ht 5' 3\" (1.6 m)    Wt 174 lb (78.9 kg)    SpO2 95%    BMI 30.82 kg/m2       Physical Examination:   General - Well appearing female  HEENT - unremarkable  Neck - supple, no bruits, no TMG, no LAD  Pulm - clear to auscultation bilaterally  Cardio - RRR, normal S1 S2, no murmur  Abd - soft, nontender, no masses, no HSM  Extrem - no edema, +2 distal pulses  Breasts- normal nipples without discharge or rash, no masses, no axillary LAD  Pelvic- normal external genitalia, speculum exam with normal appearing cervix, pap   done, bimanual exam with no CMT, adnexal tenderness or masses      Assessement and Plan:     1. Encounter for screening mammogram for breast cancer    - [facility] MAMMO BI DX INCL CAD; Future    2. Essential hypertension- Recommend following a lower sodium diet and regular cardiovascular exercise. Blood pressure goal is less than 130/85 on average. Advised compliance with blood pressure medication and regular follow up. 3. Acquired hypothyroidism- Recommend taking thyroid medication daily on empty stomach and regular follow up. 4. Osteopenia, unspecified location- Recommend weight bearing exercise, like walking. Calcium 1200mg daily, most from foods, less from supplement. Vitamin D3 2,000 iu once a day. 5. Pure hypercholesterolemia- Recommend AHA diet and regular cardiovascular exercise. 6. Gastroesophageal reflux disease without esophagitis- Recommend bland diet to reduce reflux symptoms. Avoid caffeine. 7. Screening for cervical cancer    - PAP (IMAGE GUIDED) W/REFL HPV ALL PATHOLOGY ([id number])     Follow-up Disposition:  Return for follow up pending test and 6 months.  Suzy Fernandes MD

## 2017-11-20 NOTE — MR AVS SNAPSHOT
Visit Information Date & Time Provider Department Dept. Phone Encounter #  
 11/20/2017  2:30 PM Bob Roldan, [address] [place] [address],[address] [phone] [id number] Follow-up Instructions Return for follow up pending test and 6 months. Ramone Zimmer Upcoming Health Maintenance Date Due  
 GLAUCOMA SCREENING Q2Y 10/16/2006 MEDICARE YEARLY EXAM 10/16/2006 Pneumococcal 65+ Low/Medium Risk (2 of 2 - PPSV23) 1/1/2014 Influenza Age 5 to Adult 8/1/2017 DTaP/Tdap/Td series (2 - Td) 10/21/2021 Allergies as of 11/20/2017  Review Complete On: 11/20/2017 By: Bob Roldan MD  
  
 Severity Noted Reaction Type Reactions Adhesive Tape-silicones  62/94/1979    Rash, Itching Percocet [Oxycodone-acetaminophen]  03/23/2017    Other (comments) agitation Current Immunizations  Reviewed on 11/20/2017 Name Date Influenza High Dose Vaccine PF 10/30/2017 Pneumococcal Conjugate (PCV-13) 1/1/2013 Tdap 10/21/2011 Zoster Vaccine, Live 1/1/2011 Reviewed by Bob Roldan MD on 11/20/2017 at  2:42 PM  
 Reviewed by Bob Roldan MD on 11/20/2017 at  2:51 PM  
You Were Diagnosed With   
  
 Codes Comments Screening for cervical cancer    -  Primary ICD-10-CM: Z12.4 ICD-9-CM: V76.2 Encounter for screening mammogram for breast cancer     ICD-10-CM: Z12.31 
ICD-9-CM: V76.12 Essential hypertension     ICD-10-CM: I10 
ICD-9-CM: 401.9 Acquired hypothyroidism     ICD-10-CM: E03.9 ICD-9-CM: 244.9 Osteopenia, unspecified location     ICD-10-CM: M85.80 ICD-9-CM: 733.90 Pure hypercholesterolemia     ICD-10-CM: E78.00 ICD-9-CM: 272.0 Gastroesophageal reflux disease without esophagitis     ICD-10-CM: K21.9 ICD-9-CM: 530.81 Vitals BP Pulse Temp Resp Height(growth percentile) Weight(growth percentile) 135/77 (BP 1 Location: Left arm, BP Patient Position: Sitting) 85 97.8 °F (36.6 °C) (Oral) 16 5' 3\" (1.6 m) 174 lb (78.9 kg) SpO2 BMI OB Status Smoking Status 95% 30.82 kg/m2 Postmenopausal Former Smoker BMI and BSA Data Body Mass Index Body Surface Area  
 30.82 kg/m 2 1.87 m 2 Preferred Pharmacy Pharmacy Name Phone [facility] [phone] [phone], [address] AT [place] [address] [phone] Your Updated Medication List  
  
   
This list is accurate as of: 11/20/17  2:51 PM.  Always use your most recent med list.  
  
  
  
  
 Alpha Lipoic Acid 600 mg Cap Take  by mouth. aspirin 81 mg chewable tablet Take 81 mg by mouth daily. atorvastatin 40 mg tablet Commonly known as:  LIPITOR Take 1 Tab by mouth daily. BENADRYL ALLERGY 25 mg tablet Generic drug:  diphenhydrAMINE Take 25 mg by mouth nightly. CALCIUM 600 + D(3) PO Take  by mouth. hydrALAZINE 25 mg tablet Commonly known as:  APRESOLINE Take 1 Tab by mouth three (3) times daily. levothyroxine 112 mcg tablet Commonly known as:  SYNTHROID Take 1 Tab by mouth Daily (before breakfast). LORazepam 1 mg tablet Commonly known as:  ATIVAN Take one tablet at bedtime. losartan-hydroCHLOROthiazide 100-25 mg per tablet Commonly known as:  HYZAAR Take 1 Tab by mouth daily. magnesium oxide 400 mg tablet Commonly known as:  MAG-OX Take 400 mg by mouth daily. pantoprazole 40 mg tablet Commonly known as:  PROTONIX Take 1 Tab by mouth daily. sertraline 100 mg tablet Commonly known as:  ZOLOFT Take 1 Tab by mouth daily. STOOL SOFTENER 100 mg Tab Generic drug:  docusate sodium Take  by mouth. 2 tabs PO daily  
  
 traMADol 50 mg tablet Commonly known as:  ULTRAM  
Take 50 mg by mouth every six (6) hours as needed for Pain.  
  
 triamcinolone acetonide 0.1 % ointment Commonly known as:  KENALOG  
  
 VITAMIN B-12 1,000 mcg tablet Generic drug:  cyanocobalamin Take 1,000 mcg by mouth daily. We Performed the Following PAP (IMAGE GUIDED) W/REFL HPV ALL PATHOLOGY ([id number]) [COB8479 Custom] Follow-up Instructions Return for follow up pending test and 6 months. .  
  
To-Do List   
 11/20/2017 Imaging:  DESM[facility] MAMMO BI DX INCL CAD Introducing [facility]! Dear Nenita Paz: 
Thank you for requesting a [facility] account. Our records indicate that you already have an active [facility] account. You can access your account anytime at [email or web address] Did you know that you can access your hospital and ER discharge instructions at any time in [facility]? You can also review all of your test results from your hospital stay or ER visit. Additional Information If you have questions, please visit the Frequently Asked Questions section of the [facility] website at [email or web address] ([email or web address] Remember, [facility] is NOT to be used for urgent needs. For medical emergencies, dial 911. Now available from your iPhone and Android! Please provide this summary of care documentation to your next provider. Your primary care clinician is listed as Colleen Anderson. If you have any questions after today's visit, please call [phone].

## 2017-11-20 NOTE — PROGRESS NOTES
Chief Complaint   Patient presents with    Well Woman     Pt non fasting    Complete Physical     Visit Vitals    /77 (BP 1 Location: Left arm, BP Patient Position: Sitting)    Pulse 85    Temp 97.8 °F (36.6 °C) (Oral)    Resp 16    Ht 5' 3\" (1.6 m)    Wt 174 lb (78.9 kg)    SpO2 95%    BMI 30.82 kg/m2     Reviewed record In preparation for visit and have obtained necessary documentation    1. Have you been to the ER, urgent care clinic since your last visit? Hospitalized since your last visit? NO    2. Have you seen or consulted any other health care providers outside of the [address] [place] [address] since your last visit? Include any pap smears or colon screening. Patient does not have advance directive on file.

## 2017-11-22 ENCOUNTER — TELEPHONE (OUTPATIENT)
Dept: INTERNAL MEDICINE CLINIC | Age: 77
End: 2017-11-22

## 2017-11-22 DIAGNOSIS — R92.0 BREAST MICROCALCIFICATIONS: Primary | ICD-10-CM

## 2017-11-22 DIAGNOSIS — Z98.82 H/O BILATERAL BREAST IMPLANTS: ICD-10-CM

## 2017-11-22 NOTE — TELEPHONE ENCOUNTER
Received call from [facility] in Coordination of care. Need order for bilateral dx mammo with corrected codes. Patient is following up for hx of microcalcifications. Order entered and Ponce aware.

## 2018-01-13 DIAGNOSIS — F41.9 ANXIETY: Primary | ICD-10-CM

## 2018-01-15 ENCOUNTER — HOSPITAL ENCOUNTER (OUTPATIENT)
Dept: MAMMOGRAPHY | Age: 78
Discharge: HOME | End: 2018-01-15
Attending: FAMILY MEDICINE
Payer: MEDICARE

## 2018-01-15 DIAGNOSIS — R92.0 BREAST MICROCALCIFICATIONS: ICD-10-CM

## 2018-01-15 DIAGNOSIS — Z98.82 H/O BILATERAL BREAST IMPLANTS: ICD-10-CM

## 2018-01-15 PROCEDURE — 77066 DX MAMMO INCL CAD BI: CPT

## 2018-01-15 RX ORDER — LORAZEPAM 1 MG/1
TABLET ORAL
Qty: 90 TAB | Refills: 0 | OUTPATIENT
Start: 2018-01-15 | End: 2018-04-16 | Stop reason: SDUPTHER

## 2018-01-15 NOTE — TELEPHONE ENCOUNTER
From: Ezequiel Mcdaniel  To: Osiris Ortega MD  Sent: 1/13/2018 1:51 PM EST  Subject: Medication Renewal Request    Original authorizing provider: MD Ezequiel Leon would like a refill of the following medications:  LORazepam (ATIVAN) 1 mg tablet Osiris Ortega MD]    Preferred pharmacy: [address],    Comment:

## 2018-01-15 NOTE — TELEPHONE ENCOUNTER
Approved Medications  LORazepam (ATIVAN) 1 mg tablet  Take one tablet at bedtime.        Disp: 90 Tab Refills: 0    Class: Phone In Start: 1/15/2018   For: Anxiety  Approved by: Michael Leong MD  Phoned in Ativan to [employer] per  request. [facility]

## 2018-01-15 NOTE — TELEPHONE ENCOUNTER
Requested Prescriptions     Pending Prescriptions Disp Refills    LORazepam (ATIVAN) 1 mg tablet 90 Tab 0     Sig: Take one tablet at bedtime.      Last Refill:10/13/2017    Last Office Visit: 11/20/2017    Upcoming Appointment: 05/21/2018

## 2018-02-26 DIAGNOSIS — E03.9 ACQUIRED HYPOTHYROIDISM: ICD-10-CM

## 2018-02-27 RX ORDER — LEVOTHYROXINE SODIUM 112 UG/1
112 TABLET ORAL
Qty: 90 TAB | Refills: 3 | Status: SHIPPED | OUTPATIENT
Start: 2018-02-27 | End: 2019-02-27 | Stop reason: SDUPTHER

## 2018-02-27 NOTE — TELEPHONE ENCOUNTER
From: Danny Sykes  To: Kaelyn Velasquez MD  Sent: 2/26/2018 7:14 PM EST  Subject: Medication Renewal Request    Original authorizing provider: MD Danny Hadley would like a refill of the following medications:  levothyroxine (SYNTHROID) 112 mcg tablet Kaelyn Velasquez MD]    Preferred pharmacy: [address]:

## 2018-04-02 DIAGNOSIS — I10 ESSENTIAL HYPERTENSION: ICD-10-CM

## 2018-04-02 DIAGNOSIS — E78.00 PURE HYPERCHOLESTEROLEMIA: ICD-10-CM

## 2018-04-03 RX ORDER — ATORVASTATIN CALCIUM 40 MG/1
40 TABLET, FILM COATED ORAL DAILY
Qty: 90 TAB | Refills: 3 | Status: SHIPPED | OUTPATIENT
Start: 2018-04-03 | End: 2019-04-08 | Stop reason: SDUPTHER

## 2018-04-03 RX ORDER — SERTRALINE HYDROCHLORIDE 100 MG/1
100 TABLET, FILM COATED ORAL DAILY
Qty: 90 TAB | Refills: 3 | Status: SHIPPED | OUTPATIENT
Start: 2018-04-03 | End: 2019-05-07 | Stop reason: SDUPTHER

## 2018-04-03 RX ORDER — LOSARTAN POTASSIUM AND HYDROCHLOROTHIAZIDE 25; 100 MG/1; MG/1
1 TABLET ORAL DAILY
Qty: 90 TAB | Refills: 3 | Status: SHIPPED | OUTPATIENT
Start: 2018-04-03 | End: 2018-11-14 | Stop reason: DRUGHIGH

## 2018-04-03 RX ORDER — HYDRALAZINE HYDROCHLORIDE 25 MG/1
25 TABLET, FILM COATED ORAL 3 TIMES DAILY
Qty: 270 TAB | Refills: 3 | Status: SHIPPED | OUTPATIENT
Start: 2018-04-03 | End: 2019-05-07 | Stop reason: SDUPTHER

## 2018-04-03 NOTE — TELEPHONE ENCOUNTER
From: Rhiannon Coffey  To: Nic Loera MD  Sent: 4/2/2018 9:22 PM EDT  Subject: Medication Renewal Request    Original authorizing provider: MD Rhiannon Hooks would like a refill of the following medications:  atorvastatin (LIPITOR) 40 mg tablet Nic Loera MD]  hydrALAZINE (APRESOLINE) 25 mg tablet Nic Loera MD]  losartan-hydroCHLOROthiazide (HYZAAR) 100-25 mg per tablet Nic Loera MD]  sertraline (ZOLOFT) 100 mg tablet Nic Loera MD]    Preferred pharmacy: [address] [place], [address]    Comment:

## 2018-04-03 NOTE — TELEPHONE ENCOUNTER
Requested Prescriptions     Pending Prescriptions Disp Refills    atorvastatin (LIPITOR) 40 mg tablet 90 Tab 3     Sig: Take 1 Tab by mouth daily.  hydrALAZINE (APRESOLINE) 25 mg tablet 270 Tab 3     Sig: Take 1 Tab by mouth three (3) times daily.  losartan-hydroCHLOROthiazide (HYZAAR) 100-25 mg per tablet 90 Tab 3     Sig: Take 1 Tab by mouth daily.  sertraline (ZOLOFT) 100 mg tablet 90 Tab 3     Sig: Take 1 Tab by mouth daily. PCP: Hunter Jacobo MD    Last appt: 11/20/2017  Future Appointments  Date Time Provider Summer Claudette   5/21/2018 2:30 PM Hunter Jacobo MD [facility] 87       Requested Prescriptions     Pending Prescriptions Disp Refills    atorvastatin (LIPITOR) 40 mg tablet 90 Tab 3     Sig: Take 1 Tab by mouth daily.  hydrALAZINE (APRESOLINE) 25 mg tablet 270 Tab 3     Sig: Take 1 Tab by mouth three (3) times daily.  losartan-hydroCHLOROthiazide (HYZAAR) 100-25 mg per tablet 90 Tab 3     Sig: Take 1 Tab by mouth daily.  sertraline (ZOLOFT) 100 mg tablet 90 Tab 3     Sig: Take 1 Tab by mouth daily.

## 2018-04-16 DIAGNOSIS — F41.9 ANXIETY: ICD-10-CM

## 2018-04-17 RX ORDER — LORAZEPAM 1 MG/1
TABLET ORAL
Qty: 90 TAB | Refills: 0 | OUTPATIENT
Start: 2018-04-17 | End: 2018-07-17 | Stop reason: SDUPTHER

## 2018-04-17 NOTE — TELEPHONE ENCOUNTER
PCP: Sharif Roca MD    Last appt: 11/20/2017  Future Appointments  Date Time Provider Summer Wilkins   5/21/2018 2:30 PM Sharif Roca MD [facility] 87       Requested Prescriptions     Pending Prescriptions Disp Refills    LORazepam (ATIVAN) 1 mg tablet 90 Tab 0     Sig: Take one tablet at bedtime.

## 2018-04-17 NOTE — TELEPHONE ENCOUNTER
Approved Medications  LORazepam (ATIVAN) 1 mg tablet  Take one tablet at bedtime.        Disp: 90 Tab Refills: 0    Class: Phone In Start: 4/17/2018   For: Anxiety  Approved by: Reed Conrad MD  Phoned in [employer]ald[employer] to [employer] per  request. yue

## 2018-04-17 NOTE — TELEPHONE ENCOUNTER
From: Inocente Cedillo  To: Juanjose Martinez MD  Sent: 4/16/2018 8:03 PM EDT  Subject: Medication Renewal Request    Original authorizing provider: MD Inocente Silva would like a refill of the following medications:  LORazepam (ATIVAN) 1 mg tablet Juanjose Maritnez MD]    Preferred pharmacy: [facility] [address] [place] [address] Avenue:

## 2018-05-14 ENCOUNTER — HOSPITAL ENCOUNTER (OUTPATIENT)
Dept: LAB | Age: 78
Discharge: HOME | End: 2018-05-14
Payer: MEDICARE

## 2018-05-14 ENCOUNTER — TELEPHONE (OUTPATIENT)
Dept: INTERNAL MEDICINE CLINIC | Age: 78
End: 2018-05-14

## 2018-05-14 ENCOUNTER — APPOINTMENT (OUTPATIENT)
Dept: INTERNAL MEDICINE CLINIC | Age: 78
End: 2018-05-14

## 2018-05-14 DIAGNOSIS — E03.9 ACQUIRED HYPOTHYROIDISM: ICD-10-CM

## 2018-05-14 DIAGNOSIS — I10 ESSENTIAL HYPERTENSION: ICD-10-CM

## 2018-05-14 DIAGNOSIS — E55.9 VITAMIN D DEFICIENCY: ICD-10-CM

## 2018-05-14 DIAGNOSIS — R73.09 ELEVATED GLUCOSE: ICD-10-CM

## 2018-05-14 DIAGNOSIS — E78.00 PURE HYPERCHOLESTEROLEMIA: Primary | ICD-10-CM

## 2018-05-14 DIAGNOSIS — E78.00 PURE HYPERCHOLESTEROLEMIA: ICD-10-CM

## 2018-05-14 PROCEDURE — 82306 VITAMIN D 25 HYDROXY: CPT

## 2018-05-14 PROCEDURE — 83036 HEMOGLOBIN GLYCOSYLATED A1C: CPT

## 2018-05-14 PROCEDURE — 84443 ASSAY THYROID STIM HORMONE: CPT

## 2018-05-14 PROCEDURE — 36415 COLL VENOUS BLD VENIPUNCTURE: CPT

## 2018-05-14 PROCEDURE — 80061 LIPID PANEL: CPT

## 2018-05-14 PROCEDURE — 80053 COMPREHEN METABOLIC PANEL: CPT

## 2018-05-15 LAB
25(OH)D3+25(OH)D2 SERPL-MCNC: 67 NG/ML (ref 30–100)
ALBUMIN SERPL-MCNC: 4.3 G/DL (ref 3.5–4.8)
ALBUMIN/GLOB SERPL: 1.7 {RATIO} (ref 1.2–2.2)
ALP SERPL-CCNC: 66 IU/L (ref 39–117)
ALT SERPL-CCNC: 22 IU/L (ref 0–32)
AST SERPL-CCNC: 22 IU/L (ref 0–40)
BILIRUB SERPL-MCNC: 0.6 MG/DL (ref 0–1.2)
BUN SERPL-MCNC: 23 MG/DL (ref 8–27)
BUN/CREAT SERPL: 20 (ref 12–28)
CALCIUM SERPL-MCNC: 9.9 MG/DL (ref 8.7–10.3)
CHLORIDE SERPL-SCNC: 99 MMOL/L (ref 96–106)
CHOLEST SERPL-MCNC: 161 MG/DL (ref 100–199)
CO2 SERPL-SCNC: 24 MMOL/L (ref 18–29)
CREAT SERPL-MCNC: 1.14 MG/DL (ref 0.57–1)
GFR SERPLBLD CREATININE-BSD FMLA CKD-EPI: 47 ML/MIN/1.73
GFR SERPLBLD CREATININE-BSD FMLA CKD-EPI: 54 ML/MIN/1.73
GLOBULIN SER CALC-MCNC: 2.5 G/DL (ref 1.5–4.5)
GLUCOSE SERPL-MCNC: 108 MG/DL (ref 65–99)
HBA1C MFR BLD: 5.9 % (ref 4.8–5.6)
HDLC SERPL-MCNC: 56 MG/DL
LDLC SERPL CALC-MCNC: 79 MG/DL (ref 0–99)
POTASSIUM SERPL-SCNC: 4.1 MMOL/L (ref 3.5–5.2)
PROT SERPL-MCNC: 6.8 G/DL (ref 6–8.5)
SODIUM SERPL-SCNC: 140 MMOL/L (ref 134–144)
TRIGL SERPL-MCNC: 128 MG/DL (ref 0–149)
TSH SERPL DL<=0.005 MIU/L-ACNC: 0.83 UIU/ML (ref 0.45–4.5)
VLDLC SERPL CALC-MCNC: 26 MG/DL (ref 5–40)

## 2018-05-21 ENCOUNTER — OFFICE VISIT (OUTPATIENT)
Dept: INTERNAL MEDICINE CLINIC | Age: 78
End: 2018-05-21

## 2018-05-21 VITALS
HEART RATE: 95 BPM | SYSTOLIC BLOOD PRESSURE: 137 MMHG | OXYGEN SATURATION: 94 % | TEMPERATURE: 98.5 F | RESPIRATION RATE: 18 BRPM | HEIGHT: 63 IN | BODY MASS INDEX: 31.08 KG/M2 | DIASTOLIC BLOOD PRESSURE: 87 MMHG | WEIGHT: 175.4 LBS

## 2018-05-21 DIAGNOSIS — E78.00 PURE HYPERCHOLESTEROLEMIA: ICD-10-CM

## 2018-05-21 DIAGNOSIS — Z23 ENCOUNTER FOR IMMUNIZATION: ICD-10-CM

## 2018-05-21 DIAGNOSIS — Z12.11 SCREENING FOR COLON CANCER: ICD-10-CM

## 2018-05-21 DIAGNOSIS — Z91.89 AT RISK FOR CORONARY ARTERY DISEASE: ICD-10-CM

## 2018-05-21 DIAGNOSIS — Z00.00 MEDICARE ANNUAL WELLNESS VISIT, SUBSEQUENT: ICD-10-CM

## 2018-05-21 DIAGNOSIS — K21.9 GASTROESOPHAGEAL REFLUX DISEASE WITHOUT ESOPHAGITIS: ICD-10-CM

## 2018-05-21 DIAGNOSIS — E03.9 ACQUIRED HYPOTHYROIDISM: ICD-10-CM

## 2018-05-21 DIAGNOSIS — I10 ESSENTIAL HYPERTENSION: Primary | ICD-10-CM

## 2018-05-21 NOTE — PROGRESS NOTES
Problem Focused Progress Note    Name: August Rodriguez Date: 2018  Ethnicity: NON-  Race: WHITE OR   MRN: [id number]  Age: 69 y.o.  : 1940  Sex: Female       HPI:   Roberto Carlos Tee is a 69y.o. year old female who presents today for follow up. Last seen 2017. Questions regarding immunizations.       LDL at goal.  On lipitor 40mg once a day, no myalgias. Better with exercise at [employer]. No sx with exertion. Prior cardiac workup for MORRIS, negative. Weight stable. Prediabetic  5.9%. Snacking at night.       On sertraline for anxiety and OCD.   Taking lorazepam 1mg at bedctime with benadryl 25mg, for the past.  Not sleeping well at times. Taking hydralazine 25mg TID and losartan/HCTZ 100/25mg for HTN.  BP controlled.       Taking thyroid medication. Prior ablation. Stable weight.      Prior colon screening, 2013. Normal except diverticular disease and tortous colon recheck 5 years. GM with colon cancer. Some constipation, Magnesium and stool softener routinely.        Chief Complaint   Patient presents with    Follow-up     HTN, PreDM, Chlolesterol.  Annual Wellness Visit   . Review of Systems   A comprehensive review of systems was negative except for that written in the HPI. Physical Examination     Visit Vitals    /87 (BP 1 Location: Left arm, BP Patient Position: Sitting)    Pulse 95    Temp 98.5 °F (36.9 °C) (Oral)    Resp 18    Ht 5' 3\" (1.6 m)    Wt 175 lb 6.4 oz (79.6 kg)    SpO2 94%    BMI 31.07 kg/m2     Gen: well appearing   HEENT:   PERRL,normal conjunctiva. External ear and canals normal, TMs no opacification or erythema,  OP no erythema, no exudates, MMM  Neck:   No masses or LAD  Resp:  No wheezing, no rhonchi, no rales. CV:  RRR, normal S1S2, no murmur. GI: soft, nontender, without masses. No hepatosplenomegaly. Extrem:  +2 pulses, no edema, warm distally      Assessment/Plan       ICD-10-CM ICD-9-CM    1. Essential hypertension I10 401.9    2. Medicare annual wellness visit, subsequent Z00.00 V70.0    3. Encounter for immunization Z23 V03.89 varicella-zoster recombinant, PF, (SHINGRIX) 50 mcg/0.5 mL susr injection      DISCONTINUED: varicella-zoster recombinant, PF, (SHINGRIX) 50 mcg/0.5 mL susr injection   4. At risk for coronary artery disease Z91.89 V49.89 CT HEART W/O CONT WITH CALCIUM   5. Pure hypercholesterolemia E78.00 272.0    6. Acquired hypothyroidism E03.9 244.9    7. Screening for colon cancer Z12.11 V76.51 COLOGUARD TEST (FECAL DNA COLORECTAL CANCER SCREENING)   8.  Gastroesophageal reflux disease without esophagitis K21.9 530.81        Evangelist Sesay MD  5/25/2018  2:28 PM

## 2018-05-21 NOTE — PATIENT INSTRUCTIONS
Medicare Wellness Visit, Female    The best way to live healthy is to have a lifestyle where you eat a well-balanced diet, exercise regularly, limit alcohol use, and quit all forms of tobacco/nicotine, if applicable. Regular preventive services are another way to keep healthy. Preventive services (vaccines, screening tests, monitoring & exams) can help personalize your care plan, which helps you manage your own care. Screening tests can find health problems at the earliest stages, when they are easiest to treat. [facility] follows the current, evidence-based guidelines published by the [facility] Lenn[facility] ([facility]) when recommending preventive services for our patients. Because we follow these guidelines, sometimes recommendations change over time as research supports it. (For example, mammograms used to be recommended annually. Even though Medicare will still pay for an annual mammogram, the newer guidelines recommend a mammogram every two years for women of average risk.)    Of course, you and your provider may decide to screen more often for some diseases, based on your risk and co-morbidities (chronic disease you are already diagnosed with). Preventive services for you include:    - Medicare offers their members a free annual wellness visit, which is time for you and your primary care provider to discuss and plan for your preventive service needs. Take advantage of this benefit every year!    -All people over age 72 should receive the recommended pneumonia vaccines. Current USPSTF guidelines recommend a series of two vaccines for the best pneumonia protection.     -All adults should have a yearly flu vaccine and a tetanus vaccine every 10 years. All adults age 61 years should receive a shingles vaccine once in their lifetime.      -A bone mass density test is recommended when a woman turns 65 to screen for osteoporosis.  This test is only recommended once as a screening. Some providers will use this same test as a disease monitoring tool if you already have osteoporosis. -All adults age 38-68 years who are overweight should have a diabetes screening test once every three years.     -Other screening tests & preventive services for persons with diabetes include: an eye exam to screen for diabetic retinopathy, a kidney function test, a foot exam, and stricter control over your cholesterol.     -Cardiovascular screening for adults with routine risk involves an electrocardiogram (ECG) at intervals determined by the provider.     -Colorectal cancer screenings should be done for adults age 54-65 years with normal risk. There are a number of acceptable methods of screening for this type of cancer. Each test has its own benefits and drawbacks. Discuss with your provider what is most appropriate for you during your annual wellness visit. The different tests include: colonoscopy (considered the best screening method), a fecal occult blood test, a fecal DNA test, and sigmoidoscopy. -Breast cancer screenings are recommended every other year for women of normal risk age 54-69 years.     -Cervical cancer screenings for women over age 72 are only recommended with certain risk factors.     -All adults born between [facility] should be screened once for Hepatitis C.      Here is a list of your current Health Maintenance items (your personalized list of preventive services) with a due date:  Health Maintenance Due   Topic Date Due    Pneumococcal Vaccine (2 of 2 - PPSV23) 01/01/2014    Annual Well Visit  03/14/2018

## 2018-05-21 NOTE — PROGRESS NOTES
Rehana Stephens is a 69 y.o. female    Chief Complaint   Patient presents with    Follow-up     HTN, PreDM, Chlolesterol. 1. Have you been to the ER, urgent care clinic since your last visit? Hospitalized since your last visit? Yes Where: [facility] [address]     2. Have you seen or consulted any other health care providers outside of the [facility] since your last visit? Include any pap smears or colon screening.  Yes Reason for visit: Andrew in March 2018 went to [facility].    /87 (BP 1 Location: Left arm, BP Patient Position: Sitting)  Pulse 95  Temp 98.5 °F (36.9 °C) (Oral)   Resp 18  Ht 5' 3\" (1.6 m)  Wt 175 lb 6.4 oz (79.6 kg)  SpO2 94%  BMI 31.07 kg/m2

## 2018-05-21 NOTE — MR AVS SNAPSHOT
[address] [facility] [address] [facility] 83. 
[phone] Patient: Delilah Borja MRN: [id number] :1940 Visit Information Date & Time Provider Department Dept. Phone Encounter #  
 2018  2:30 PM Brenton Alva, [address] [place] [address] Avenue [phone] [id number] Follow-up Instructions Return in about 6 months (around 2018) for follow up for annual exam. labs one week prior . Upcoming Health Maintenance Date Due Pneumococcal 65+ Low/Medium Risk (2 of 2 - PPSV23) 2014 MEDICARE YEARLY EXAM 3/14/2018 Influenza Age 5 to Adult 2018 GLAUCOMA SCREENING Q2Y 2020 DTaP/Tdap/Td series (2 - Td) 10/21/2021 Allergies as of 2018  Review Complete On: 2018 By: Brenton Alva MD  
  
 Severity Noted Reaction Type Reactions Adhesive Tape-silicones      Rash, Itching Percocet [Oxycodone-acetaminophen]  2017    Other (comments) agitation Current Immunizations  Reviewed on 2018 Name Date Influenza High Dose Vaccine PF 10/30/2017 Pneumococcal Conjugate (PCV-13) 2013 Tdap 10/21/2011 Zoster Vaccine, Live 2011 Reviewed by Brenton Alva MD on 2018 at  3:05 PM  
You Were Diagnosed With   
  
 Codes Comments Essential hypertension    -  Primary ICD-10-CM: I10 
ICD-9-CM: 401.9 Medicare annual wellness visit, subsequent     ICD-10-CM: Z00.00 ICD-9-CM: V70.0 Encounter for immunization     ICD-10-CM: V40 ICD-9-CM: V03.89 At risk for coronary artery disease     ICD-10-CM: Z91.89 ICD-9-CM: V49.89 Pure hypercholesterolemia     ICD-10-CM: E78.00 ICD-9-CM: 272.0 Acquired hypothyroidism     ICD-10-CM: E03.9 ICD-9-CM: 244.9 Screening for colon cancer     ICD-10-CM: Z12.11 ICD-9-CM: V76.51 Gastroesophageal reflux disease without esophagitis     ICD-10-CM: K21.9 ICD-9-CM: 530.81 Vitals BP Pulse Temp Resp Height(growth percentile) Weight(growth percentile) 137/87 (BP 1 Location: Left arm, BP Patient Position: Sitting) 95 98.5 °F (36.9 °C) (Oral) 18 5' 3\" (1.6 m) 175 lb 6.4 oz (79.6 kg) SpO2 BMI OB Status Smoking Status 94% 31.07 kg/m2 Postmenopausal Former Smoker Vitals History BMI and BSA Data Body Mass Index Body Surface Area 31.07 kg/m 2 1.88 m 2 Preferred Pharmacy Pharmacy Name Phone Yasmeen Kam [address], [address] [place] [address] [facility] [phone] Your Updated Medication List  
  
   
This list is accurate as of 5/21/18  3:14 PM.  Always use your most recent med list.  
  
  
  
  
 aspirin 81 mg chewable tablet Take 81 mg by mouth daily. atorvastatin 40 mg tablet Commonly known as:  LIPITOR Take 1 Tab by mouth daily. BENADRYL ALLERGY 25 mg tablet Generic drug:  diphenhydrAMINE Take 25 mg by mouth nightly. CALCIUM 600 + D(3) PO Take  by mouth. hydrALAZINE 25 mg tablet Commonly known as:  APRESOLINE Take 1 Tab by mouth three (3) times daily. levothyroxine 112 mcg tablet Commonly known as:  SYNTHROID Take 1 Tab by mouth Daily (before breakfast). LORazepam 1 mg tablet Commonly known as:  ATIVAN Take one tablet at bedtime. losartan-hydroCHLOROthiazide 100-25 mg per tablet Commonly known as:  HYZAAR Take 1 Tab by mouth daily. magnesium oxide 400 mg tablet Commonly known as:  MAG-OX Take 400 mg by mouth daily. pantoprazole 40 mg tablet Commonly known as:  PROTONIX Take 1 Tab by mouth daily. sertraline 100 mg tablet Commonly known as:  ZOLOFT Take 1 Tab by mouth daily. STOOL SOFTENER 100 mg Tab Generic drug:  docusate sodium Take  by mouth. 2 tabs PO daily  
  
 triamcinolone acetonide 0.1 % ointment Commonly known as:  KENALOG  
  
 varicella-zoster recombinant (PF) 50 mcg/0.5 mL Susr injection Commonly known as:  SHINGRIX  
0.5 mL by IntraMUSCular route once for 1 dose. VITAMIN B-12 1,000 mcg tablet Generic drug:  cyanocobalamin Take 1,000 mcg by mouth daily. Prescriptions Printed Refills  
 varicella-zoster recombinant, PF, (SHINGRIX) 50 mcg/0.5 mL susr injection 1 Si.5 mL by IntraMUSCular route once for 1 dose. Class: Print Route: IntraMUSCular We Performed the Following COLOGUARD TEST (FECAL DNA COLORECTAL CANCER SCREENING) [66903 CPT(R)] Follow-up Instructions Return in about 6 months (around 2018) for follow up for annual exam. labs one week prior . To-Do List   
 2018 Imaging:  CT HEART W/O CONT WITH CALCIUM Patient Instructions Medicare Wellness Visit, Female The best way to live healthy is to have a lifestyle where you eat a well-balanced diet, exercise regularly, limit alcohol use, and quit all forms of tobacco/nicotine, if applicable. Regular preventive services are another way to keep healthy. Preventive services (vaccines, screening tests, monitoring & exams) can help personalize your care plan, which helps you manage your own care. Screening tests can find health problems at the earliest stages, when they are easiest to treat. [facility] follows the current, evidence-based guidelines published by the [facility] Feroz Lennox (USPSTF) when recommending preventive services for our patients. Because we follow these guidelines, sometimes recommendations change over time as research supports it. (For example, mammograms used to be recommended annually. Even though Medicare will still pay for an annual mammogram, the newer guidelines recommend a mammogram every two years for women of average risk.) Of course, you and your provider may decide to screen more often for some diseases, based on your risk and co-morbidities (chronic disease you are already diagnosed with). Preventive services for you include: - Medicare offers their members a free annual wellness visit, which is time for you and your primary care provider to discuss and plan for your preventive service needs. Take advantage of this benefit every year! 
 
-All people over age 72 should receive the recommended pneumonia vaccines. Current USPSTF guidelines recommend a series of two vaccines for the best pneumonia protection.  
 
-All adults should have a yearly flu vaccine and a tetanus vaccine every 10 years. All adults age 61 years should receive a shingles vaccine once in their lifetime.   
 
-A bone mass density test is recommended when a woman turns 65 to screen for osteoporosis. This test is only recommended once as a screening. Some providers will use this same test as a disease monitoring tool if you already have osteoporosis. -All adults age 38-68 years who are overweight should have a diabetes screening test once every three years.  
 
-Other screening tests & preventive services for persons with diabetes include: an eye exam to screen for diabetic retinopathy, a kidney function test, a foot exam, and stricter control over your cholesterol.  
 
-Cardiovascular screening for adults with routine risk involves an electrocardiogram (ECG) at intervals determined by the provider.  
 
-Colorectal cancer screenings should be done for adults age 54-65 years with normal risk. There are a number of acceptable methods of screening for this type of cancer. Each test has its own benefits and drawbacks. Discuss with your provider what is most appropriate for you during your annual wellness visit. The different tests include: colonoscopy (considered the best screening method), a fecal occult blood test, a fecal DNA test, and sigmoidoscopy. -Breast cancer screenings are recommended every other year for women of normal risk age 54-69 years. -Cervical cancer screenings for women over age 72 are only recommended with certain risk factors.  
 
-All adults born between [facility] should be screened once for Hepatitis C. Here is a list of your current Health Maintenance items (your personalized list of preventive services) with a due date: 
Health Maintenance Due Topic Date Due  Pneumococcal Vaccine (2 of 2 - PPSV23) 01/01/2014 [address] [place] [address] Annual Well Visit  03/14/2018 Introducing \[facility]\"" & HEALTH SERVICES! Dear Onida: 
Thank you for requesting a [employer] account. Our records indicate that you already have an active [employer] account. You can access your account anytime at [email or web address] Service [email or web address] Did you know that you can access your hospital and ER discharge instructions at any time in [employer]? You can also review all of your test results from your hospital stay or ER visit. Additional Information If you have questions, please visit the Frequently Asked Questions section of the [employer] website at [email or web address] [employer]/. Remember, [employer] is NOT to be used for urgent needs. For medical emergencies, dial 911. Now available from your iPhone and Android! Please provide this summary of care documentation to your next provider. Your primary care clinician is listed as Levar Cordova. If you have any questions after today's visit, please call [phone].

## 2018-05-21 NOTE — PROGRESS NOTES
This is the Subsequent Medicare Annual Wellness Exam, performed 12 months or more after the Initial AWV or the last Subsequent AWV    I have reviewed the patient's medical history in detail and updated the computerized patient record. History     Past Medical History:   Diagnosis Date    Anxiety     Chronic pain of both shoulders     Diverticulosis     GERD (gastroesophageal reflux disease)     H/O tubal ligation     Hyperlipidemia     Hypertension     Hypothyroidism     Menopause     Prediabetes     Vitamin D deficiency       Past Surgical History:   Procedure Laterality Date    HX APPENDECTOMY      HX BREAST AUGMENTATION  01/1993    HX BREAST BIOPSY Bilateral     right Breast 1 year and left breast 4 years ago.  HX DILATION AND CURETTAGE  1971-1976    x 3 due to [address] HX HEENT      HX HEMORRHOIDECTOMY      banded    HX INTRACRANIAL ANEURYSM REPAIR  11/1997    brain aneurysm clipping, [facility]    HX TONSIL AND ADENOIDECTOMY      IMPLANT BREAST SILICONE/EQ       Current Outpatient Prescriptions   Medication Sig Dispense Refill    LORazepam (ATIVAN) 1 mg tablet Take one tablet at bedtime. 90 Tab 0    atorvastatin (LIPITOR) 40 mg tablet Take 1 Tab by mouth daily. 90 Tab 3    hydrALAZINE (APRESOLINE) 25 mg tablet Take 1 Tab by mouth three (3) times daily. 270 Tab 3    losartan-hydroCHLOROthiazide (HYZAAR) 100-25 mg per tablet Take 1 Tab by mouth daily. 90 Tab 3    sertraline (ZOLOFT) 100 mg tablet Take 1 Tab by mouth daily. 90 Tab 3    levothyroxine (SYNTHROID) 112 mcg tablet Take 1 Tab by mouth Daily (before breakfast). 90 Tab 3    triamcinolone acetonide (KENALOG) 0.1 % ointment       CALCIUM CARBONATE/VITAMIN D3 (CALCIUM 600 + D,3, PO) Take  by mouth.  aspirin 81 mg chewable tablet Take 81 mg by mouth daily.  diphenhydrAMINE (BENADRYL ALLERGY) 25 mg tablet Take 25 mg by mouth nightly.  docusate sodium (STOOL SOFTENER) 100 mg tab Take  by mouth.  2 tabs PO daily      magnesium oxide (MAG-OX) 400 mg tablet Take 400 mg by mouth daily.  cyanocobalamin (VITAMIN B-12) 1,000 mcg tablet Take 1,000 mcg by mouth daily.  pantoprazole (PROTONIX) 40 mg tablet Take 1 Tab by mouth daily. 90 Tab 3    Alpha Lipoic Acid 600 mg cap Take  by mouth.  traMADol (ULTRAM) 50 mg tablet Take 50 mg by mouth every six (6) hours as needed for Pain. Allergies   Allergen Reactions    Adhesive Tape-Silicones Rash and Itching    Percocet [Oxycodone-Acetaminophen] Other (comments)     agitation     Family History   Problem Relation Age of Onset    Heart Disease Mother     Stroke Mother     Hypertension Mother     Diabetes Mother     Elevated Lipids Mother     Diabetes Father     Heart Disease Father     Hypertension Father    Di[place] He Elevated Lipids Father     Cancer Sister 36     breast cancer    Sleep Apnea Sister     Elevated Lipids Sister     Heart Surgery Brother     Heart Disease Brother     Cancer Maternal Grandmother      colon cancer    Stroke Maternal Grandfather     Diabetes Paternal Grandmother     Cancer Paternal Grandfather      pancreatic cancer     Social History   Substance Use Topics    Smoking status: Former Smoker     Packs/day: 1.00     Years: 30.00     Quit date: 3/23/2007    Smokeless tobacco: Never Used    Alcohol use 0.6 oz/week     1 Glasses of wine per week      Comment: Once a day     Patient Active Problem List   Diagnosis Code    Optic neuropathy, right H46.9    Essential hypertension I10    Pure hypercholesterolemia E78.00    Acquired hypothyroidism E03.9    Osteopenia M85.80    Elevated glucose R73.09    Gastroesophageal reflux disease without esophagitis K21.9       Depression Risk Factor Screening:     PHQ over the last two weeks 5/21/2018   Little interest or pleasure in doing things Not at all   Feeling down, depressed or hopeless Not at all   Total Score PHQ 2 0     Alcohol Risk Factor Screening:    On any occasion in the past three months you have had more than 3 drinks containing alcohol. Functional Ability and Level of Safety:   Hearing Loss  Hearing is good. Activities of Daily Living  The home contains: no safety equipment. Patient does total self care    Fall Risk  Fall Risk Assessment, last 12 mths 5/21/2018   Able to walk? Yes   Fall in past 12 months? Yes   Fall with injury? No   Number of falls in past 12 months 1   Fall Risk Score 1       Abuse Screen  Patient is not abused    Cognitive Screening   Evaluation of Cognitive Function:  Has your family/caregiver stated any concerns about your memory: no  Normal    Patient Care Team   Patient Care Team:  Reed Conrad MD as PCP - General (Internal Medicine)    Assessment/Plan   Education and counseling provided:  Are appropriate based on today's review and evaluation    Diagnoses and all orders for this visit:    1.  Medicare annual wellness visit, subsequent      Health Maintenance Due   Topic Date Due    Pneumococcal 65+ Low/Medium Risk (2 of 2 - PPSV23) 01/01/2014    MEDICARE YEARLY EXAM  03/14/2018

## 2018-06-06 ENCOUNTER — HOSPITAL ENCOUNTER (OUTPATIENT)
Dept: CT IMAGING | Age: 78
Discharge: HOME | End: 2018-06-06
Attending: FAMILY MEDICINE
Payer: SELF-PAY

## 2018-06-06 DIAGNOSIS — Z91.89 AT RISK FOR CORONARY ARTERY DISEASE: ICD-10-CM

## 2018-06-06 PROCEDURE — 75571 CT HRT W/O DYE W/CA TEST: CPT

## 2018-06-10 NOTE — PROGRESS NOTES
Advise patient CT heart shows elevated calcium score indicating cholesterol plaque in the arteries. She is already on aspirin. Cholesterol, blood pressure and diabetes well controlled. She can see a cardiologist to see if they feel that she needs any further work up.

## 2018-07-05 RX ORDER — PANTOPRAZOLE SODIUM 40 MG/1
40 TABLET, DELAYED RELEASE ORAL DAILY
Qty: 90 TAB | Refills: 3 | Status: SHIPPED | OUTPATIENT
Start: 2018-07-05 | End: 2019-09-23 | Stop reason: SDUPTHER

## 2018-07-05 NOTE — TELEPHONE ENCOUNTER
From: Lasha Carrillo  To: Audrey Paz MD  Sent: 7/4/2018 9:09 PM EDT  Subject: Medication Renewal Request    Original authorizing provider: MD Lasha Gong would like a refill of the following medications:  pantoprazole (PROTONIX) 40 mg tablet Audrey Paz MD]    Preferred pharmacy: [facility] [address] [place] [address] Avenue:

## 2018-07-17 DIAGNOSIS — F41.9 ANXIETY: ICD-10-CM

## 2018-07-18 RX ORDER — LORAZEPAM 1 MG/1
TABLET ORAL
Qty: 90 TAB | Refills: 0 | OUTPATIENT
Start: 2018-07-18 | End: 2018-10-17 | Stop reason: SDUPTHER

## 2018-07-18 NOTE — TELEPHONE ENCOUNTER
PCP: Venkat Solitario MD    Last appt: 5/21/2018  Future Appointments  Date Time Provider Summer Wilkins   11/14/2018 1:30 PM Venkat Solitario MD [facility] 87       Requested Prescriptions     Pending Prescriptions Disp Refills    LORazepam (ATIVAN) 1 mg tablet 90 Tab 0     Sig: Take one tablet at bedtime.

## 2018-07-18 NOTE — TELEPHONE ENCOUNTER
Approved Medications  LORazepam (ATIVAN) 1 mg tablet  Take one tablet at bedtime.        Disp: 90 Tab Refills: 0    Class: Phone In Start: 7/18/2018   For: Anxiety  Approved by: Melania Campbell MD    Phoned in Lorazepam to [address] [place] [address] per  request. [facility]

## 2018-07-18 NOTE — TELEPHONE ENCOUNTER
From: Valeri Li  To: Carol Enriquez MD  Sent: 7/17/2018 7:48 PM EDT  Subject: Medication Renewal Request    Original authorizing provider: MD Valeri Meek would like a refill of the following medications:  LORazepam (ATIVAN) 1 mg tablet Carol Enriquez MD]    Preferred pharmacy: [address] Avenue:

## 2018-10-17 DIAGNOSIS — F41.9 ANXIETY: ICD-10-CM

## 2018-10-18 RX ORDER — LORAZEPAM 1 MG/1
TABLET ORAL
Qty: 90 TAB | Refills: 0 | OUTPATIENT
Start: 2018-10-18 | End: 2019-01-17 | Stop reason: SDUPTHER

## 2018-10-19 NOTE — TELEPHONE ENCOUNTER
Approved Medications      LORazepam (ATIVAN) 1 mg tablet         Sig: Take one tablet at bedtime. Disp:  90 Tab    Refills:  0    Start: 10/18/2018    Class: Phone In    Authorized by: Rachele Barragan MD    For: Anxiety        To be filled at: [facility] [address], [address]        Phoned in Lorazepam to [address] per  request. [employer] with Will Kim who gave Anna Lopez.

## 2018-11-07 ENCOUNTER — APPOINTMENT (OUTPATIENT)
Dept: INTERNAL MEDICINE CLINIC | Age: 78
End: 2018-11-07

## 2018-11-07 ENCOUNTER — HOSPITAL ENCOUNTER (OUTPATIENT)
Dept: LAB | Age: 78
Discharge: HOME | End: 2018-11-07
Payer: MEDICARE

## 2018-11-07 DIAGNOSIS — R73.09 ELEVATED GLUCOSE: ICD-10-CM

## 2018-11-07 DIAGNOSIS — I10 ESSENTIAL HYPERTENSION, MALIGNANT: Primary | ICD-10-CM

## 2018-11-07 DIAGNOSIS — E78.00 PURE HYPERCHOLESTEROLEMIA: ICD-10-CM

## 2018-11-07 DIAGNOSIS — E03.9 PRIMARY HYPOTHYROIDISM: ICD-10-CM

## 2018-11-07 PROCEDURE — 80053 COMPREHEN METABOLIC PANEL: CPT

## 2018-11-07 PROCEDURE — 83036 HEMOGLOBIN GLYCOSYLATED A1C: CPT

## 2018-11-07 PROCEDURE — 84443 ASSAY THYROID STIM HORMONE: CPT

## 2018-11-07 PROCEDURE — 36415 COLL VENOUS BLD VENIPUNCTURE: CPT

## 2018-11-07 PROCEDURE — 80061 LIPID PANEL: CPT

## 2018-11-08 LAB
ALBUMIN SERPL-MCNC: 4.6 G/DL (ref 3.5–4.8)
ALBUMIN/GLOB SERPL: 1.8 {RATIO} (ref 1.2–2.2)
ALP SERPL-CCNC: 67 IU/L (ref 39–117)
ALT SERPL-CCNC: 29 IU/L (ref 0–32)
AST SERPL-CCNC: 31 IU/L (ref 0–40)
BILIRUB SERPL-MCNC: 0.6 MG/DL (ref 0–1.2)
BUN SERPL-MCNC: 11 MG/DL (ref 8–27)
BUN/CREAT SERPL: 9 (ref 12–28)
CALCIUM SERPL-MCNC: 9.6 MG/DL (ref 8.7–10.3)
CHLORIDE SERPL-SCNC: 100 MMOL/L (ref 96–106)
CHOLEST SERPL-MCNC: 157 MG/DL (ref 100–199)
CO2 SERPL-SCNC: 22 MMOL/L (ref 20–29)
CREAT SERPL-MCNC: 1.25 MG/DL (ref 0.57–1)
GLOBULIN SER CALC-MCNC: 2.6 G/DL (ref 1.5–4.5)
GLUCOSE SERPL-MCNC: 146 MG/DL (ref 65–99)
HBA1C MFR BLD: 5.8 % (ref 4.8–5.6)
HDLC SERPL-MCNC: 58 MG/DL
LDLC SERPL CALC-MCNC: 73 MG/DL (ref 0–99)
POTASSIUM SERPL-SCNC: 3.9 MMOL/L (ref 3.5–5.2)
PROT SERPL-MCNC: 7.2 G/DL (ref 6–8.5)
SODIUM SERPL-SCNC: 143 MMOL/L (ref 134–144)
TRIGL SERPL-MCNC: 132 MG/DL (ref 0–149)
TSH SERPL DL<=0.005 MIU/L-ACNC: 0.53 UIU/ML (ref 0.45–4.5)
VLDLC SERPL CALC-MCNC: 26 MG/DL (ref 5–40)

## 2018-11-14 ENCOUNTER — OFFICE VISIT (OUTPATIENT)
Dept: INTERNAL MEDICINE CLINIC | Age: 78
End: 2018-11-14

## 2018-11-14 VITALS
SYSTOLIC BLOOD PRESSURE: 108 MMHG | RESPIRATION RATE: 16 BRPM | HEART RATE: 88 BPM | DIASTOLIC BLOOD PRESSURE: 71 MMHG | BODY MASS INDEX: 29.98 KG/M2 | OXYGEN SATURATION: 94 % | HEIGHT: 63 IN | WEIGHT: 169.2 LBS | TEMPERATURE: 98.1 F

## 2018-11-14 DIAGNOSIS — I10 ESSENTIAL HYPERTENSION: Primary | ICD-10-CM

## 2018-11-14 DIAGNOSIS — R73.09 ELEVATED GLUCOSE: ICD-10-CM

## 2018-11-14 DIAGNOSIS — K21.9 GASTROESOPHAGEAL REFLUX DISEASE WITHOUT ESOPHAGITIS: ICD-10-CM

## 2018-11-14 DIAGNOSIS — Z12.31 ENCOUNTER FOR SCREENING MAMMOGRAM FOR BREAST CANCER: ICD-10-CM

## 2018-11-14 DIAGNOSIS — E03.9 ACQUIRED HYPOTHYROIDISM: ICD-10-CM

## 2018-11-14 DIAGNOSIS — E78.00 PURE HYPERCHOLESTEROLEMIA: ICD-10-CM

## 2018-11-14 DIAGNOSIS — R53.83 FATIGUE, UNSPECIFIED TYPE: ICD-10-CM

## 2018-11-14 DIAGNOSIS — R79.89 ELEVATED SERUM CREATININE: ICD-10-CM

## 2018-11-14 LAB — HGB BLD-MCNC: 14.3 G/DL

## 2018-11-14 RX ORDER — LOSARTAN POTASSIUM AND HYDROCHLOROTHIAZIDE 12.5; 1 MG/1; MG/1
1 TABLET ORAL DAILY
Qty: 90 TAB | Refills: 3 | Status: SHIPPED | OUTPATIENT
Start: 2018-11-14 | End: 2019-11-11 | Stop reason: SDUPTHER

## 2018-11-14 RX ORDER — CHOLECALCIFEROL TAB 125 MCG (5000 UNIT) 125 MCG
TAB ORAL DAILY
COMMUNITY

## 2018-11-14 NOTE — PROGRESS NOTES
Sweta Redd is a 69 y.o. female who presents for follow up. Last seen in May Feeling fatigued. Thyroid is well supplemented. Stopped benadryl and using lavender spray. Sleeping 12 hours, feeling better. Trying to go to exercise classes at [facility], going 2 days a week, wants to go 4 days. Creatinine increased over the past year. Treated for HTN. On diuretics. Drinking 5-6 glasses of water a day. Occasional NSAIDS. On aspirin. Is concerned about CAD risk. CT heart for calcium score, positive in LAD. Had cardiology and pulmonary evaluation 5 years ago for SOB. Negative testing. The MORRIS no different. No exertional chest pain.   
  
Questions regarding immunizations.  
   
LDL at goal.  On lipitor 40mg once a day, no myalgias. Prediabetic  5.8%. Diet is better. Some weight loss. Less wine, less sugar. Low carb diet. Tried to stop protonix, had GERD.   
    
Had cologuard this year. Past Medical History:  
Diagnosis Date  Anxiety  Chronic pain of both shoulders  Diverticulosis  GERD (gastroesophageal reflux disease)  H/O tubal ligation  Hyperlipidemia  Hypertension  Hypothyroidism  Menopause  Prediabetes  Vitamin D deficiency Family History Problem Relation Age of Onset  Heart Disease Mother  Stroke Mother  Hypertension Mother  Diabetes Mother  Elevated Lipids Mother  Diabetes Father  Heart Disease Father  Hypertension Father  Elevated Lipids Father  Cancer Sister 36  
     breast cancer  Sleep Apnea Sister  Elevated Lipids Sister  Heart Surgery Brother  Heart Disease Brother  Cancer Maternal Grandmother   
     colon cancer  Stroke Maternal Grandfather  Diabetes Paternal Grandmother  Cancer Paternal Grandfather   
     pancreatic cancer Social History Socioeconomic History  Marital status:  Spouse name: Not on file  Number of children: Not on file  Years of education: Not on file  Highest education level: Not on file Social Needs  Financial resource strain: Not on file  Food insecurity - worry: Not on file  Food insecurity - inability: Not on file  Transportation needs - medical: Not on file  Transportation needs - non-medical: Not on file Occupational History  Not on file Tobacco Use  Smoking status: Former Smoker Packs/day: 1.00 Years: 30.00 Pack years: 30.00 Last attempt to quit: 3/23/2007 Years since quittin.6  Smokeless tobacco: Never Used Substance and Sexual Activity  Alcohol use: Yes Alcohol/week: 0.6 oz Types: 1 Glasses of wine per week Comment: Once a day  Drug use: No  
 Sexual activity: No  
  Partners: Male Birth control/protection: None Other Topics Concern  Not on file Social History Narrative  Not on file Current Outpatient Medications on File Prior to Visit Medication Sig Dispense Refill  cholecalciferol, VITAMIN D3, (VITAMIN D3) 5,000 unit tab tablet Take  by mouth daily.  LORazepam (ATIVAN) 1 mg tablet Take one tablet at bedtime. 90 Tab 0  
 pantoprazole (PROTONIX) 40 mg tablet Take 1 Tab by mouth daily. 90 Tab 3  
 atorvastatin (LIPITOR) 40 mg tablet Take 1 Tab by mouth daily. 90 Tab 3  
 hydrALAZINE (APRESOLINE) 25 mg tablet Take 1 Tab by mouth three (3) times daily. 270 Tab 3  
 sertraline (ZOLOFT) 100 mg tablet Take 1 Tab by mouth daily. 90 Tab 3  
 levothyroxine (SYNTHROID) 112 mcg tablet Take 1 Tab by mouth Daily (before breakfast). 90 Tab 3  
 aspirin 81 mg chewable tablet Take 81 mg by mouth daily.  docusate sodium (STOOL SOFTENER) 100 mg tab Take  by mouth. 2 tabs PO daily  magnesium oxide (MAG-OX) 400 mg tablet Take 400 mg by mouth daily.  cyanocobalamin (VITAMIN B-12) 1,000 mcg tablet Take 1,000 mcg by mouth daily.  triamcinolone acetonide (KENALOG) 0.1 % ointment No current facility-administered medications on file prior to visit. Review of Systems Pertinent items are noted in HPI. Objective:  
 
Visit Vitals /71 (BP 1 Location: Left arm, BP Patient Position: Sitting) Pulse 88 Temp 98.1 °F (36.7 °C) (Oral) Resp 16 Ht 5' 3\" (1.6 m) Wt 169 lb 3.2 oz (76.7 kg) SpO2 94% BMI 29.97 kg/m² Gen: well appearing female HEENT:   PERRL,normal conjunctiva. External ear and canals normal, TMs no opacification or erythema,  OP no erythema, no exudates, MMM Neck:  Supple. Thyroid normal size, nontender, without nodules. No masses or LAD Resp:  No wheezing, no rhonchi, no rales. CV:  RRR, normal S1S2, no murmur. GI: soft, nontender, without masses. No hepatosplenomegaly. Extrem:  +2 pulses, no edema, warm distally Assessment/Plan: ICD-10-CM ICD-9-CM 1. Essential hypertension I10 401.9 losartan-hydroCHLOROthiazide (HYZAAR) 100-12.5 mg per tablet 2. Gastroesophageal reflux disease without esophagitis K21.9 530.81   
3. Acquired hypothyroidism E03.9 244.9 4. Pure hypercholesterolemia E78.00 272.0 5. Elevated glucose R73.09 790.29 HEMOGLOBIN A1C W/O EAG  
6. Fatigue, unspecified type R53.83 780.79 AMB POC HEMOGLOBIN (HGB) 7. Elevated serum creatinine P79.00 294.20 METABOLIC PANEL, BASIC 8. Encounter for screening mammogram for breast cancer Z12.31 V76.12 [facility] MAMMO BI SCREENING INCL CAD Follow-up Disposition: 
Return in about 3 months (around 2/14/2019) for FOLLOW UP ON MEDICATIONS. Lucila Velez MD 
 
Discussed the patient's BMI with her. The BMI follow up plan is as follows:  
 
dietary management education, guidance, and counseling 
encourage exercise 
monitor weight 
prescribed dietary intake An After Visit Summary was printed and given to the patient.

## 2018-11-14 NOTE — PATIENT INSTRUCTIONS
Body Mass Index: Care Instructions Your Care Instructions Body mass index (BMI) can help you see if your weight is raising your risk for health problems. It uses a formula to compare how much you weigh with how tall you are. · A BMI lower than 18.5 is considered underweight. · A BMI between 18.5 and 24.9 is considered healthy. · A BMI between 25 and 29.9 is considered overweight. A BMI of 30 or higher is considered obese. If your BMI is in the normal range, it means that you have a lower risk for weight-related health problems. If your BMI is in the overweight or obese range, you may be at increased risk for weight-related health problems, such as high blood pressure, heart disease, stroke, arthritis or joint pain, and diabetes. If your BMI is in the underweight range, you may be at increased risk for health problems such as fatigue, lower protection (immunity) against illness, muscle loss, bone loss, hair loss, and hormone problems. BMI is just one measure of your risk for weight-related health problems. You may be at higher risk for health problems if you are not active, you eat an unhealthy diet, or you drink too much alcohol or use tobacco products. Follow-up care is a key part of your treatment and safety. Be sure to make and go to all appointments, and call your doctor if you are having problems. It's also a good idea to know your test results and keep a list of the medicines you take. How can you care for yourself at home? · Practice healthy eating habits. This includes eating plenty of fruits, vegetables, whole grains, lean protein, and low-fat dairy. · If your doctor recommends it, get more exercise. Walking is a good choice. Bit by bit, increase the amount you walk every day. Try for at least 30 minutes on most days of the week. · Do not smoke. Smoking can increase your risk for health problems.  If you need help quitting, talk to your doctor about stop-smoking programs and medicines. These can increase your chances of quitting for good. · Limit alcohol to 2 drinks a day for men and 1 drink a day for women. Too much alcohol can cause health problems. If you have a BMI higher than 25 · Your doctor may do other tests to check your risk for weight-related health problems. This may include measuring the distance around your waist. A waist measurement of more than 40 inches in men or 35 inches in women can increase the risk of weight-related health problems. · Talk with your doctor about steps you can take to stay healthy or improve your health. You may need to make lifestyle changes to lose weight and stay healthy, such as changing your diet and getting regular exercise. If you have a BMI lower than 18.5 · Your doctor may do other tests to check your risk for health problems. · Talk with your doctor about steps you can take to stay healthy or improve your health. You may need to make lifestyle changes to gain or maintain weight and stay healthy, such as getting more healthy foods in your diet and doing exercises to build muscle. Where can you learn more? Go to http://cirilo-aristeo.info/. Enter S176 in the search box to learn more about \"Body Mass Index: Care Instructions. \" Current as of: October 13, 2016 Content Version: 11.4 © 3059-4661 [employer]. Care instructions adapted under license by [employer] (which disclaims liability or warranty for this information). If you have questions about a medical condition or this instruction, always ask your healthcare professional. [place] [address] any warranty or liability for your use of this information.  
 
INCREASE THE CARDIO TO 3 DAYS A WEEK

## 2019-01-17 DIAGNOSIS — F41.9 ANXIETY: ICD-10-CM

## 2019-01-18 DIAGNOSIS — F51.01 PRIMARY INSOMNIA: Primary | ICD-10-CM

## 2019-01-18 DIAGNOSIS — F41.9 ANXIETY: ICD-10-CM

## 2019-01-18 RX ORDER — LORAZEPAM 1 MG/1
TABLET ORAL
Qty: 90 TAB | Refills: 0 | OUTPATIENT
Start: 2019-01-18 | End: 2019-02-14 | Stop reason: SDUPTHER

## 2019-01-18 RX ORDER — LORAZEPAM 1 MG/1
TABLET ORAL
Qty: 90 TAB | Refills: 0 | OUTPATIENT
Start: 2019-01-18 | End: 2019-04-17 | Stop reason: SDUPTHER

## 2019-01-18 NOTE — TELEPHONE ENCOUNTER
Approved Medications      LORazepam (ATIVAN) 1 mg tablet         Sig: Take one tablet at bedtime. Disp:  90 Tab    Refills:  0    Start: 1/18/2019    Class: Phone In    Authorized by: Nel Pal MD    For: Anxiety        Phoned in Lorazepam to [address] per  request. Farida Alva with [facility] who gave Jayden Mullins.

## 2019-01-18 NOTE — TELEPHONE ENCOUNTER
PCP: Juan Barrientos MD    Last appt: 11/14/2018  Future Appointments   Date Time Provider Summer Wilkins   1/21/2019  1:15 PM [facility] 3 [facility]   2/14/2019  2:15 PM Juan Barrientos MD [facility] 87       Requested Prescriptions     Pending Prescriptions Disp Refills    LORazepam (ATIVAN) 1 mg tablet 90 Tab 0     Sig: Take one tablet at bedtime.

## 2019-01-19 RX ORDER — LORAZEPAM 1 MG/1
TABLET ORAL
Qty: 90 TAB | Refills: 0 | OUTPATIENT
Start: 2019-01-19 | End: 2019-02-14 | Stop reason: SDUPTHER

## 2019-01-21 ENCOUNTER — HOSPITAL ENCOUNTER (OUTPATIENT)
Dept: MAMMOGRAPHY | Age: 79
Discharge: HOME | End: 2019-01-21
Attending: FAMILY MEDICINE

## 2019-01-21 DIAGNOSIS — Z12.31 ENCOUNTER FOR SCREENING MAMMOGRAM FOR BREAST CANCER: ICD-10-CM

## 2019-01-21 NOTE — TELEPHONE ENCOUNTER
Approved Medications      LORazepam (ATIVAN) 1 mg tablet         Sig: TAKE ONE TABLET BY MOUTH EVERY NIGHT AT BEDTIME    Disp:  90 Tab    Refills:  0    Start: 1/19/2019    Class: Phone In    Authorized by: Juan Barrientos MD    For: Primary insomnia    Requested on: 10/19/2018           Rx has been phoned in to [facility]  Rx was picked up on 01/19/2019 at 2:30 pm per Karolina Lynn pharmacist .

## 2019-01-25 ENCOUNTER — HOSPITAL ENCOUNTER (OUTPATIENT)
Dept: MAMMOGRAPHY | Age: 79
Discharge: HOME | End: 2019-01-25
Attending: FAMILY MEDICINE
Payer: MEDICARE

## 2019-01-25 DIAGNOSIS — Z12.31 VISIT FOR SCREENING MAMMOGRAM: ICD-10-CM

## 2019-01-25 PROCEDURE — 77063 BREAST TOMOSYNTHESIS BI: CPT

## 2019-02-08 ENCOUNTER — HOSPITAL ENCOUNTER (OUTPATIENT)
Dept: LAB | Age: 79
Discharge: HOME | End: 2019-02-08
Payer: MEDICARE

## 2019-02-08 DIAGNOSIS — R79.89 ELEVATED SERUM CREATININE: ICD-10-CM

## 2019-02-08 DIAGNOSIS — R73.09 ELEVATED GLUCOSE: ICD-10-CM

## 2019-02-08 PROCEDURE — 83036 HEMOGLOBIN GLYCOSYLATED A1C: CPT

## 2019-02-08 PROCEDURE — 80048 BASIC METABOLIC PNL TOTAL CA: CPT

## 2019-02-08 PROCEDURE — 36415 COLL VENOUS BLD VENIPUNCTURE: CPT

## 2019-02-09 LAB
BUN SERPL-MCNC: 12 MG/DL (ref 8–27)
BUN/CREAT SERPL: 11 (ref 12–28)
CALCIUM SERPL-MCNC: 9.8 MG/DL (ref 8.7–10.3)
CHLORIDE SERPL-SCNC: 103 MMOL/L (ref 96–106)
CO2 SERPL-SCNC: 23 MMOL/L (ref 20–29)
CREAT SERPL-MCNC: 1.05 MG/DL (ref 0.57–1)
GLUCOSE SERPL-MCNC: 122 MG/DL (ref 65–99)
HBA1C MFR BLD: 5.8 % (ref 4.8–5.6)
POTASSIUM SERPL-SCNC: 4.3 MMOL/L (ref 3.5–5.2)
SODIUM SERPL-SCNC: 144 MMOL/L (ref 134–144)

## 2019-02-14 ENCOUNTER — OFFICE VISIT (OUTPATIENT)
Dept: INTERNAL MEDICINE CLINIC | Age: 79
End: 2019-02-14

## 2019-02-14 VITALS
OXYGEN SATURATION: 93 % | SYSTOLIC BLOOD PRESSURE: 137 MMHG | BODY MASS INDEX: 30.3 KG/M2 | HEIGHT: 63 IN | RESPIRATION RATE: 16 BRPM | WEIGHT: 171 LBS | HEART RATE: 88 BPM | DIASTOLIC BLOOD PRESSURE: 82 MMHG | TEMPERATURE: 97.6 F

## 2019-02-14 DIAGNOSIS — E78.00 PURE HYPERCHOLESTEROLEMIA: ICD-10-CM

## 2019-02-14 DIAGNOSIS — R73.09 ELEVATED GLUCOSE: ICD-10-CM

## 2019-02-14 DIAGNOSIS — I10 ESSENTIAL HYPERTENSION: Primary | ICD-10-CM

## 2019-02-14 DIAGNOSIS — R73.03 PREDIABETES: ICD-10-CM

## 2019-02-14 DIAGNOSIS — E03.9 ACQUIRED HYPOTHYROIDISM: ICD-10-CM

## 2019-02-14 NOTE — PROGRESS NOTES
Mauricio Diana is a 69 y.o. female who presents for follow up. Last seen Nov 2018. Treated for HTN. BP at home. . On lower HCTZ dose. Creatinine reduced from 1.25 to 1.05. No dizziness. Exercise classes at [employer]. No sx with exertion. No NSAIDS. Drinking water. LDL at goal.  On lipitor 40mg once a day, no myalgias.  
  
Prediabetic  5.8%. Diet is better. weight increased 4#. Trying to follow diet. Treated for hypothyroidism. TSH in November. 2018. Had mammogram a few weeks ago. Saw Dr. Pam Ulloa. No change. Past Medical History:  
Diagnosis Date  Anxiety  Chronic pain of both shoulders  Diverticulosis  GERD (gastroesophageal reflux disease)  H/O tubal ligation  Hyperlipidemia  Hypertension  Hypothyroidism  Menopause  Prediabetes  Vitamin D deficiency Family History Problem Relation Age of Onset  Heart Disease Mother  Stroke Mother  Hypertension Mother  Diabetes Mother  Elevated Lipids Mother  Diabetes Father  Heart Disease Father  Hypertension Father  Elevated Lipids Father  Cancer Sister 36  
     breast cancer  Sleep Apnea Sister  Elevated Lipids Sister  Breast Cancer Sister 36  
 Heart Surgery Brother  Heart Disease Brother  Cancer Maternal Grandmother   
     colon cancer  Stroke Maternal Grandfather  Diabetes Paternal Grandmother  Cancer Paternal Grandfather   
     pancreatic cancer Social History Socioeconomic History  Marital status:  Spouse name: Not on file  Number of children: Not on file  Years of education: Not on file  Highest education level: Not on file Social Needs  Financial resource strain: Not on file  Food insecurity - worry: Not on file  Food insecurity - inability: Not on file  Transportation needs - medical: Not on file  Transportation needs - non-medical: Not on file Occupational History  Not on file Tobacco Use  Smoking status: Former Smoker Packs/day: 1.00 Years: 30.00 Pack years: 30.00 Last attempt to quit: 3/23/2007 Years since quittin.9  Smokeless tobacco: Never Used Substance and Sexual Activity  Alcohol use: Yes Alcohol/week: 0.6 oz Types: 1 Glasses of wine per week Comment: Once a day  Drug use: No  
 Sexual activity: No  
  Partners: Male Birth control/protection: None Other Topics Concern  Not on file Social History Narrative  Not on file Current Outpatient Medications on File Prior to Visit Medication Sig Dispense Refill  LORazepam (ATIVAN) 1 mg tablet Take one tablet at bedtime. 90 Tab 0  cholecalciferol, VITAMIN D3, (VITAMIN D3) 5,000 unit tab tablet Take  by mouth daily.  losartan-hydroCHLOROthiazide (HYZAAR) 100-12.5 mg per tablet Take 1 Tab by mouth daily. DOSE CHANGE 90 Tab 3  pantoprazole (PROTONIX) 40 mg tablet Take 1 Tab by mouth daily. 90 Tab 3  
 atorvastatin (LIPITOR) 40 mg tablet Take 1 Tab by mouth daily. 90 Tab 3  
 hydrALAZINE (APRESOLINE) 25 mg tablet Take 1 Tab by mouth three (3) times daily. 270 Tab 3  
 sertraline (ZOLOFT) 100 mg tablet Take 1 Tab by mouth daily. 90 Tab 3  
 levothyroxine (SYNTHROID) 112 mcg tablet Take 1 Tab by mouth Daily (before breakfast). 90 Tab 3  
 aspirin 81 mg chewable tablet Take 81 mg by mouth daily.  docusate sodium (STOOL SOFTENER) 100 mg tab Take  by mouth. 2 tabs PO daily  magnesium oxide (MAG-OX) 400 mg tablet Take 400 mg by mouth daily.  cyanocobalamin (VITAMIN B-12) 1,000 mcg tablet Take 1,000 mcg by mouth daily.  triamcinolone acetonide (KENALOG) 0.1 % ointment No current facility-administered medications on file prior to visit. Review of Systems Pertinent items are noted in HPI. Objective:  
 
Visit Vitals /82 (BP 1 Location: Left arm, BP Patient Position: Sitting) Pulse 88 Temp 97.6 °F (36.4 °C) (Oral) Resp 16 Ht 5' 3\" (1.6 m) Wt 171 lb (77.6 kg) SpO2 93% BMI 30.29 kg/m² Gen: well appearing female Resp:  No wheezing, no rhonchi, no rales. CV:  RRR, normal D2E3, 2/6 systolic murmur. GI: soft, nontender, without masses. Extrem:  +2 pulses, no edema, warm distally Assessment/Plan: ICD-10-CM ICD-9-CM 1. Essential hypertension C89 368.0 METABOLIC PANEL, COMPREHENSIVE  
   CBC W/O DIFF  
   URINALYSIS W/ RFLX MICROSCOPIC 2. Pure hypercholesterolemia E78.00 272.0 LIPID PANEL 3. Acquired hypothyroidism E03.9 244.9 TSH 3RD GENERATION 4. Prediabetes R73.03 790.29 HEMOGLOBIN A1C W/O EAG 5. Elevated glucose R73.09 790.29 HEMOGLOBIN A1C W/O EAG Recommend following a lower sodium diet and stay active. Blood pressure goal is less than 130/85 on average. Advised compliance with blood pressure medication and regular follow up. Follow-up Disposition: 
Return in about 6 months (around 8/14/2019) for follow up on medications.   Alis Augustin MD

## 2019-02-14 NOTE — PROGRESS NOTES
Reviewed record in preparation for visit and have obtained necessary documentation. Identified pt with two pt identifiers(name and ). Chief Complaint Patient presents with  Follow Up Chronic Condition  
  hypertension and pre-diabetic Health Maintenance Due Topic Date Due  Shingles Vaccine (2 of 2) 2018 Ms. Yumiko Crowder has a reminder for a \"due or due soon\" health maintenance. I have asked that she discuss this further with her primary care provider for follow-up on this health maintenance. Coordination of Care Questionnaire: 
:  
 
1) Have you been to an emergency room, urgent care clinic since your last visit? no  
Hospitalized since your last visit? no          
 
2) Have you seen or consulted any other health care providers outside of [address] [place] [address] since your last visit? no  (Include any pap smears or colon screenings in this section.) 3) In the event something were to happen to you and you were unable to speak on your behalf, do you have an Advance Directive/ Living Will in place stating your wishes? NO Do you have an Advance Directive on file? no 
 
4) Are you interested in receiving information on Advance Directives? NO Patient is accompanied by self I have received verbal consent from Rosaura Backer to discuss any/all medical information while they are present in the room.

## 2019-02-27 DIAGNOSIS — E03.9 ACQUIRED HYPOTHYROIDISM: ICD-10-CM

## 2019-02-27 RX ORDER — LEVOTHYROXINE SODIUM 112 UG/1
TABLET ORAL
Qty: 90 TAB | Refills: 2 | Status: SHIPPED | OUTPATIENT
Start: 2019-02-27 | End: 2019-11-25 | Stop reason: SDUPTHER

## 2019-04-08 DIAGNOSIS — E78.00 PURE HYPERCHOLESTEROLEMIA: ICD-10-CM

## 2019-04-08 RX ORDER — ATORVASTATIN CALCIUM 40 MG/1
TABLET, FILM COATED ORAL
Qty: 90 TAB | Refills: 0 | Status: SHIPPED | OUTPATIENT
Start: 2019-04-08 | End: 2019-07-07 | Stop reason: SDUPTHER

## 2019-04-17 DIAGNOSIS — F41.9 ANXIETY: ICD-10-CM

## 2019-04-18 DIAGNOSIS — F41.9 ANXIETY: ICD-10-CM

## 2019-04-18 RX ORDER — LORAZEPAM 1 MG/1
TABLET ORAL
Qty: 90 TAB | Refills: 0 | OUTPATIENT
Start: 2019-04-18 | End: 2019-07-18 | Stop reason: SDUPTHER

## 2019-04-18 RX ORDER — LORAZEPAM 1 MG/1
TABLET ORAL
Qty: 90 TAB | Refills: 0 | OUTPATIENT
Start: 2019-04-18 | End: 2019-07-19 | Stop reason: SDUPTHER

## 2019-05-07 DIAGNOSIS — I10 ESSENTIAL HYPERTENSION: ICD-10-CM

## 2019-05-08 RX ORDER — HYDRALAZINE HYDROCHLORIDE 25 MG/1
TABLET, FILM COATED ORAL
Qty: 270 TAB | Refills: 2 | Status: SHIPPED | OUTPATIENT
Start: 2019-05-08 | End: 2020-02-14 | Stop reason: SDUPTHER

## 2019-05-08 RX ORDER — SERTRALINE HYDROCHLORIDE 100 MG/1
TABLET, FILM COATED ORAL
Qty: 90 TAB | Refills: 2 | Status: SHIPPED | OUTPATIENT
Start: 2019-05-08 | End: 2020-02-14 | Stop reason: SDUPTHER

## 2019-06-03 ENCOUNTER — OFFICE VISIT (OUTPATIENT)
Dept: INTERNAL MEDICINE CLINIC | Age: 79
End: 2019-06-03

## 2019-06-03 VITALS
DIASTOLIC BLOOD PRESSURE: 68 MMHG | WEIGHT: 171 LBS | HEIGHT: 63 IN | HEART RATE: 99 BPM | TEMPERATURE: 98 F | OXYGEN SATURATION: 95 % | BODY MASS INDEX: 30.3 KG/M2 | RESPIRATION RATE: 16 BRPM | SYSTOLIC BLOOD PRESSURE: 121 MMHG

## 2019-06-03 DIAGNOSIS — R05.9 COUGH: Primary | ICD-10-CM

## 2019-06-03 RX ORDER — FLUTICASONE PROPIONATE AND SALMETEROL 100; 50 UG/1; UG/1
1 POWDER RESPIRATORY (INHALATION) 2 TIMES DAILY
Qty: 1 INHALER | Refills: 1 | Status: SHIPPED | OUTPATIENT
Start: 2019-06-03 | End: 2019-08-14

## 2019-06-03 RX ORDER — CODEINE PHOSPHATE AND GUAIFENESIN 10; 100 MG/5ML; MG/5ML
5 SOLUTION ORAL
Qty: 120 ML | Refills: 0 | Status: SHIPPED | OUTPATIENT
Start: 2019-06-03 | End: 2019-06-06

## 2019-06-03 RX ORDER — CEFDINIR 300 MG/1
300 CAPSULE ORAL 2 TIMES DAILY
Qty: 14 CAP | Refills: 0 | Status: SHIPPED | OUTPATIENT
Start: 2019-06-03 | End: 2019-08-14

## 2019-06-03 NOTE — PATIENT INSTRUCTIONS
Office Policies    Phone calls/patient messages:            Please allow up to 24 hours for someone in the office to contact you about your call or message. Be mindful your provider may be out of the office or your message may require further review. We encourage you to use Hitch for your messages as this is a faster, more efficient way to communicate with our office                         Medication Refills:            Prescription medications require 48-72 business hours to process. We encourage you to use [facility] for your refills. For controlled medications: Please allow 72 business hours to process. Certain medications may require you to  a written prescription at our office. NO narcotic/controlled medications will be prescribed after 4pm Monday through Friday or on weekends              Form/Paperwork Completion:            Please note a $25 fee may incur for all paperwork for completed by our providers. We ask that you allow 7-10 business days. Pre-payment is due prior to picking up/faxing the completed form. You may also download your forms to Hitch to have your doctor print off.

## 2019-06-03 NOTE — PROGRESS NOTES
HISTORY OF PRESENT ILLNESS  Guillermina Fall is a 69 y.o. female. HPI   PCP Dr Fidel Sykes  Here for acute care    Here with daughter  C/o cough x 3 days , unable to get relief with robitussin DM  No f/c cp or wheezes  Appetite is down  Former smoker-neg f/u for copd in the past per pt  Patient Active Problem List    Diagnosis Date Noted    Prediabetes 02/14/2019    Gastroesophageal reflux disease without esophagitis 11/20/2017    Optic neuropathy, right 03/23/2017    Essential hypertension 03/23/2017    Pure hypercholesterolemia 03/23/2017    Acquired hypothyroidism 03/23/2017    Osteopenia 03/23/2017    Elevated glucose 03/23/2017     Current Outpatient Medications   Medication Sig Dispense Refill    hydrALAZINE (APRESOLINE) 25 mg tablet TAKE ONE TABLET BY MOUTH THREE TIMES A  Tab 2    sertraline (ZOLOFT) 100 mg tablet TAKE ONE TABLET BY MOUTH DAILY 90 Tab 2    LORazepam (ATIVAN) 1 mg tablet Take one tablet at bedtime. 90 Tab 0    LORazepam (ATIVAN) 1 mg tablet TAKE ONE TABLET BY MOUTH EVERY NIGHT AT BEDTIME 90 Tab 0    atorvastatin (LIPITOR) 40 mg tablet TAKE ONE TABLET BY MOUTH DAILY 90 Tab 0    levothyroxine (SYNTHROID) 112 mcg tablet TAKE ONE TABLET BY MOUTH DAILY BEFORE BREAKFAST 90 Tab 2    cholecalciferol, VITAMIN D3, (VITAMIN D3) 5,000 unit tab tablet Take  by mouth daily.  losartan-hydroCHLOROthiazide (HYZAAR) 100-12.5 mg per tablet Take 1 Tab by mouth daily. DOSE CHANGE 90 Tab 3    pantoprazole (PROTONIX) 40 mg tablet Take 1 Tab by mouth daily. 90 Tab 3    triamcinolone acetonide (KENALOG) 0.1 % ointment       aspirin 81 mg chewable tablet Take 81 mg by mouth daily.  docusate sodium (STOOL SOFTENER) 100 mg tab Take  by mouth. 2 tabs PO daily      magnesium oxide (MAG-OX) 400 mg tablet Take 400 mg by mouth daily.  cyanocobalamin (VITAMIN B-12) 1,000 mcg tablet Take 1,000 mcg by mouth daily.        Allergies   Allergen Reactions    Adhesive Tape-Silicones Rash and Itching  Percocet [Oxycodone-Acetaminophen] Other (comments)     agitation      Lab Results   Component Value Date/Time    GFR est non-AA 51 (L) 02/08/2019 09:10 AM    GFR est AA 59 (L) 02/08/2019 09:10 AM    Creatinine 1.05 (H) 02/08/2019 09:10 AM    BUN 12 02/08/2019 09:10 AM    Sodium 144 02/08/2019 09:10 AM    Potassium 4.3 02/08/2019 09:10 AM    Chloride 103 02/08/2019 09:10 AM    CO2 23 02/08/2019 09:10 AM        ROS    Physical Exam   Constitutional: She appears well-developed and well-nourished. Appears stated age   Cardiovascular: Normal rate, regular rhythm and normal heart sounds. Exam reveals no gallop and no friction rub. No murmur heard. Pulmonary/Chest: Effort normal and breath sounds normal. No respiratory distress. She has no wheezes. Abdominal: Soft. Bowel sounds are normal.   Musculoskeletal: She exhibits no edema. Neurological: She is alert. Psychiatric: She has a normal mood and affect. Nursing note and vitals reviewed. ASSESSMENT and PLAN  Diagnoses and all orders for this visit:    1. Cough  -     guaiFENesin-codeine (ROBAFEN AC) 100-10 mg/5 mL solution; Take 5 mL by mouth three (3) times daily as needed for Cough for up to 3 days. Max Daily Amount: 15 mL. Treat for bronchitis--omnicef, shawanda ac syrup and advair   To call or return if not improved 1-2 weeks  Other orders  -     cefdinir (OMNICEF) 300 mg capsule; Take 1 Cap by mouth two (2) times a day. -     fluticasone propion-salmeterol (ADVAIR/WIXELA) 100-50 mcg/dose diskus inhaler; Take 1 Puff by inhalation two (2) times a day.

## 2019-07-07 DIAGNOSIS — E78.00 PURE HYPERCHOLESTEROLEMIA: ICD-10-CM

## 2019-07-08 RX ORDER — ATORVASTATIN CALCIUM 40 MG/1
40 TABLET, FILM COATED ORAL DAILY
Qty: 90 TAB | Refills: 0 | Status: SHIPPED | OUTPATIENT
Start: 2019-07-08 | End: 2019-10-06 | Stop reason: SDUPTHER

## 2019-07-08 NOTE — TELEPHONE ENCOUNTER
[facility] Note:  Blood flow rate: 250 ml/min  Net loss:zero  Replacement solution: 2,500 ml/hr, standard bag with 4.0 mEq/L potassium  Anticoagulation: none   All labs reviewed including:     Recent Labs  Lab 06/22/18  0430 06/22/18  0050 06/21/18 2054 06/21/18  1530   POTASSIUM 4.0 3.9 4.0 4.1   CO2 21 21 20* 17*   CALCIUM 7.7* 7.3* 7.3* 7.4*   MG 2.6*  --  2.6* 2.6*   PHOS 3.6  --  5.2* 5.2*       Discussed with nurse and reviewed labs       PCP: Royce Brock MD    Last appt: 6/3/2019  Future Appointments   Date Time Provider Summer Wilkins   8/14/2019  2:15 PM Royce Brock MD [facility] 87       Requested Prescriptions     Pending Prescriptions Disp Refills    atorvastatin (LIPITOR) 40 mg tablet 90 Tab 0

## 2019-07-18 DIAGNOSIS — F41.9 ANXIETY: ICD-10-CM

## 2019-07-19 RX ORDER — LORAZEPAM 1 MG/1
TABLET ORAL
Qty: 90 TAB | Refills: 0 | OUTPATIENT
Start: 2019-07-19 | End: 2019-10-17 | Stop reason: SDUPTHER

## 2019-07-19 NOTE — TELEPHONE ENCOUNTER
Approved Medications      LORazepam (ATIVAN) 1 mg tablet         Sig: Take one tablet at bedtime.     Disp:  90 Tab    Refills:  0    Start: 7/19/2019    Class: Phone In    Authorized by: Maxi Diaz MD    For: Anxiety           Phoned in Lorazepam to [phone] [place] [address] per  request.     yue

## 2019-08-07 ENCOUNTER — HOSPITAL ENCOUNTER (OUTPATIENT)
Dept: LAB | Age: 79
Discharge: HOME | End: 2019-08-07
Payer: MEDICARE

## 2019-08-07 DIAGNOSIS — I10 ESSENTIAL HYPERTENSION: ICD-10-CM

## 2019-08-07 DIAGNOSIS — E78.00 PURE HYPERCHOLESTEROLEMIA: ICD-10-CM

## 2019-08-07 DIAGNOSIS — R73.09 ELEVATED GLUCOSE: ICD-10-CM

## 2019-08-07 DIAGNOSIS — R73.03 PREDIABETES: ICD-10-CM

## 2019-08-07 DIAGNOSIS — E03.9 ACQUIRED HYPOTHYROIDISM: ICD-10-CM

## 2019-08-07 PROCEDURE — 80061 LIPID PANEL: CPT

## 2019-08-07 PROCEDURE — 83036 HEMOGLOBIN GLYCOSYLATED A1C: CPT

## 2019-08-07 PROCEDURE — 36415 COLL VENOUS BLD VENIPUNCTURE: CPT

## 2019-08-07 PROCEDURE — 80053 COMPREHEN METABOLIC PANEL: CPT

## 2019-08-07 PROCEDURE — 84443 ASSAY THYROID STIM HORMONE: CPT

## 2019-08-07 PROCEDURE — 81003 URINALYSIS AUTO W/O SCOPE: CPT

## 2019-08-07 PROCEDURE — 85027 COMPLETE CBC AUTOMATED: CPT

## 2019-08-08 LAB
ALBUMIN SERPL-MCNC: 4.3 G/DL (ref 3.5–4.8)
ALBUMIN/GLOB SERPL: 1.6 {RATIO} (ref 1.2–2.2)
ALP SERPL-CCNC: 72 IU/L (ref 39–117)
ALT SERPL-CCNC: 19 IU/L (ref 0–32)
APPEARANCE UR: CLEAR
AST SERPL-CCNC: 25 IU/L (ref 0–40)
BILIRUB SERPL-MCNC: 0.5 MG/DL (ref 0–1.2)
BILIRUB UR QL STRIP: NEGATIVE
BUN SERPL-MCNC: 15 MG/DL (ref 8–27)
BUN/CREAT SERPL: 11 (ref 12–28)
CALCIUM SERPL-MCNC: 10 MG/DL (ref 8.7–10.3)
CHLORIDE SERPL-SCNC: 101 MMOL/L (ref 96–106)
CHOLEST SERPL-MCNC: 172 MG/DL (ref 100–199)
CO2 SERPL-SCNC: 24 MMOL/L (ref 20–29)
COLOR UR: YELLOW
CREAT SERPL-MCNC: 1.33 MG/DL (ref 0.57–1)
ERYTHROCYTE [DISTWIDTH] IN BLOOD BY AUTOMATED COUNT: 13 % (ref 12.3–15.4)
GLOBULIN SER CALC-MCNC: 2.7 G/DL (ref 1.5–4.5)
GLUCOSE SERPL-MCNC: 120 MG/DL (ref 65–99)
GLUCOSE UR QL: NEGATIVE
HBA1C MFR BLD: 6 % (ref 4.8–5.6)
HCT VFR BLD AUTO: 43.1 % (ref 34–46.6)
HDLC SERPL-MCNC: 51 MG/DL
HGB BLD-MCNC: 13.8 G/DL (ref 11.1–15.9)
HGB UR QL STRIP: NEGATIVE
KETONES UR QL STRIP: NEGATIVE
LDLC SERPL CALC-MCNC: 82 MG/DL (ref 0–99)
LEUKOCYTE ESTERASE UR QL STRIP: NEGATIVE
MCH RBC QN AUTO: 31.6 PG (ref 26.6–33)
MCHC RBC AUTO-ENTMCNC: 32 G/DL (ref 31.5–35.7)
MCV RBC AUTO: 99 FL (ref 79–97)
MICRO URNS: ABNORMAL
NITRITE UR QL STRIP: NEGATIVE
PH UR STRIP: 8.5 [PH] (ref 5–7.5)
PLATELET # BLD AUTO: 275 X10E3/UL (ref 150–450)
POTASSIUM SERPL-SCNC: 4.4 MMOL/L (ref 3.5–5.2)
PROT SERPL-MCNC: 7 G/DL (ref 6–8.5)
PROT UR QL STRIP: NEGATIVE
RBC # BLD AUTO: 4.37 X10E6/UL (ref 3.77–5.28)
SODIUM SERPL-SCNC: 142 MMOL/L (ref 134–144)
SP GR UR: 1.01 (ref 1–1.03)
TRIGL SERPL-MCNC: 197 MG/DL (ref 0–149)
TSH SERPL DL<=0.005 MIU/L-ACNC: 2.24 UIU/ML (ref 0.45–4.5)
UROBILINOGEN UR STRIP-MCNC: 0.2 MG/DL (ref 0.2–1)
VLDLC SERPL CALC-MCNC: 39 MG/DL (ref 5–40)
WBC # BLD AUTO: 4.6 X10E3/UL (ref 3.4–10.8)

## 2019-08-14 ENCOUNTER — OFFICE VISIT (OUTPATIENT)
Dept: INTERNAL MEDICINE CLINIC | Age: 79
End: 2019-08-14

## 2019-08-14 ENCOUNTER — TELEPHONE (OUTPATIENT)
Dept: INTERNAL MEDICINE CLINIC | Age: 79
End: 2019-08-14

## 2019-08-14 ENCOUNTER — HOSPITAL ENCOUNTER (OUTPATIENT)
Dept: GENERAL RADIOLOGY | Age: 79
Discharge: HOME | End: 2019-08-14
Payer: MEDICARE

## 2019-08-14 VITALS
RESPIRATION RATE: 16 BRPM | OXYGEN SATURATION: 95 % | TEMPERATURE: 97.8 F | HEART RATE: 78 BPM | SYSTOLIC BLOOD PRESSURE: 134 MMHG | DIASTOLIC BLOOD PRESSURE: 82 MMHG | HEIGHT: 63 IN | WEIGHT: 166 LBS | BODY MASS INDEX: 29.41 KG/M2

## 2019-08-14 DIAGNOSIS — M54.50 CHRONIC BILATERAL LOW BACK PAIN WITHOUT SCIATICA: ICD-10-CM

## 2019-08-14 DIAGNOSIS — M85.80 OSTEOPENIA, UNSPECIFIED LOCATION: ICD-10-CM

## 2019-08-14 DIAGNOSIS — M54.9 UPPER BACK PAIN: ICD-10-CM

## 2019-08-14 DIAGNOSIS — M79.18 MYOFASCIAL PAIN: ICD-10-CM

## 2019-08-14 DIAGNOSIS — M54.50 CHRONIC BILATERAL LOW BACK PAIN WITHOUT SCIATICA: Primary | ICD-10-CM

## 2019-08-14 DIAGNOSIS — Z13.31 SCREENING FOR DEPRESSION: ICD-10-CM

## 2019-08-14 DIAGNOSIS — Z00.00 MEDICARE ANNUAL WELLNESS VISIT, SUBSEQUENT: ICD-10-CM

## 2019-08-14 DIAGNOSIS — G89.29 CHRONIC BILATERAL LOW BACK PAIN WITHOUT SCIATICA: ICD-10-CM

## 2019-08-14 DIAGNOSIS — Z78.0 POSTMENOPAUSAL: ICD-10-CM

## 2019-08-14 DIAGNOSIS — G89.29 CHRONIC BILATERAL LOW BACK PAIN WITHOUT SCIATICA: Primary | ICD-10-CM

## 2019-08-14 PROCEDURE — 72072 X-RAY EXAM THORAC SPINE 3VWS: CPT

## 2019-08-14 PROCEDURE — 72100 X-RAY EXAM L-S SPINE 2/3 VWS: CPT

## 2019-08-14 RX ORDER — PREGABALIN 75 MG/1
75 CAPSULE ORAL 2 TIMES DAILY
Qty: 60 CAP | Refills: 2 | Status: SHIPPED | OUTPATIENT
Start: 2019-08-14 | End: 2019-10-17 | Stop reason: SDUPTHER

## 2019-08-14 NOTE — PROGRESS NOTES
Emelia Burton is a 69 y.o. female who presents for follow up. Reports lower back pain. Reports pain in shoulders and upper back. No neuro sx. No weakness. Stretches daily. Uses heat and tylenol. Stopped lipitor in the past, no change in symptoms. To get a massage. Saw specialist for shoulder pain, had PT, felt worse. Treated for HTN. BP at home. . On lower HCTZ dose. Creatinine 1.33. Drinking plenty of water. No NSAIDS used. No dizziness. Exercise classes at [employer]. No sx with exertion. No NSAIDS. Drinking water.       LDL at goal.  On lipitor 40mg once a day, no myalgias.      Prediabetic  5.8% to 6.0%. not exercising. Trying to follow diet, off recently. Cut out wine, cutting down on candy.       Treated for hypothyroidism.   TSH at goal.     On sertraline, mood is ok.        Past Medical History:   Diagnosis Date    Anxiety     Chronic pain of both shoulders     Diverticulosis     GERD (gastroesophageal reflux disease)     H/O tubal ligation     Hyperlipidemia     Hypertension     Hypothyroidism     Menopause     Prediabetes     Vitamin D deficiency        Family History   Problem Relation Age of Onset    Heart Disease Mother     Stroke Mother     Hypertension Mother     Diabetes Mother     Elevated Lipids Mother     Diabetes Father     Heart Disease Father     Hypertension Father     Elevated Lipids Father     Cancer Sister 36        breast cancer    Sleep Apnea Sister     Elevated Lipids Sister     Breast Cancer Sister 36    Heart Surgery Brother     Heart Disease Brother     Cancer Maternal Grandmother         colon cancer    Stroke Maternal Grandfather     Diabetes Paternal Grandmother     Cancer Paternal Grandfather         pancreatic cancer       Social History     Socioeconomic History    Marital status:      Spouse name: Not on file    Number of children: Not on file    Years of education: Not on file    Highest education level: Not on file   Occupational History    Not on file   Social Needs    Financial resource strain: Not on file    Food insecurity:     Worry: Not on file     Inability: Not on file    Transportation needs:     Medical: Not on file     Non-medical: Not on file   Tobacco Use    Smoking status: Former Smoker     Packs/day: 1.00     Years: 30.00     Pack years: 30.00     Last attempt to quit: 3/23/2007     Years since quittin.4    Smokeless tobacco: Never Used   Substance and Sexual Activity    Alcohol use: Yes     Alcohol/week: 1.0 standard drinks     Types: 1 Glasses of wine per week     Comment: Once a day    Drug use: No    Sexual activity: Never     Partners: Male     Birth control/protection: None   Lifestyle    Physical activity:     Days per week: Not on file     Minutes per session: Not on file    Stress: Not on file   Relationships    Social connections:     Talks on phone: Not on file     Gets together: Not on file     Attends [employer] service: Not on file     Active member of club or organization: Not on file     Attends meetings of clubs or organizations: Not on file     Relationship status: Not on file    Intimate partner violence:     Fear of current or ex partner: Not on file     Emotionally abused: Not on file     Physically abused: Not on file     Forced sexual activity: Not on file   Other Topics Concern    Not on file   Social History Narrative    Not on file       Current Outpatient Medications on File Prior to Visit   Medication Sig Dispense Refill    LORazepam (ATIVAN) 1 mg tablet Take one tablet at bedtime. 90 Tab 0    atorvastatin (LIPITOR) 40 mg tablet Take 1 Tab by mouth daily.  90 Tab 0    hydrALAZINE (APRESOLINE) 25 mg tablet TAKE ONE TABLET BY MOUTH THREE TIMES A  Tab 2    sertraline (ZOLOFT) 100 mg tablet TAKE ONE TABLET BY MOUTH DAILY 90 Tab 2    levothyroxine (SYNTHROID) 112 mcg tablet TAKE ONE TABLET BY MOUTH DAILY BEFORE BREAKFAST 90 Tab 2    cholecalciferol, VITAMIN D3, (VITAMIN D3) 5,000 unit tab tablet Take  by mouth daily.  losartan-hydroCHLOROthiazide (HYZAAR) 100-12.5 mg per tablet Take 1 Tab by mouth daily. DOSE CHANGE 90 Tab 3    pantoprazole (PROTONIX) 40 mg tablet Take 1 Tab by mouth daily. 90 Tab 3    aspirin 81 mg chewable tablet Take 81 mg by mouth daily.  docusate sodium (STOOL SOFTENER) 100 mg tab Take  by mouth. 2 tabs PO daily      magnesium oxide (MAG-OX) 400 mg tablet Take 400 mg by mouth daily.  cyanocobalamin (VITAMIN B-12) 1,000 mcg tablet Take 1,000 mcg by mouth daily.  triamcinolone acetonide (KENALOG) 0.1 % ointment        No current facility-administered medications on file prior to visit. Review of Systems  Pertinent items are noted in HPI. Objective:     Visit Vitals  /82 (BP 1 Location: Left arm, BP Patient Position: Sitting)   Pulse 78   Temp 97.8 °F (36.6 °C) (Oral)   Resp 16   Ht 5' 3\" (1.6 m)   Wt 166 lb (75.3 kg)   SpO2 95%   BMI 29.41 kg/m²     Gen: well appearing female  HEENT:   PERRL,normal conjunctiva. External ear and canals normal, TMs no opacification or erythema,  OP no erythema, no exudates, MMM  Neck:  Supple. Thyroid normal size, nontender, without nodules. No masses or LAD  Resp:  No wheezing, no rhonchi, no rales. CV:  RRR, normal S1S2, no murmur. GI: soft, nontender, without masses. No hepatosplenomegaly. Extrem:  +2 pulses, no edema, warm distally  Back- paraspinous muscle pain on palpation, no vertebral pain, able to move on exam table without difficulty  Neuro- alert, normal gait, negative SLR, 5/5 motor in lower extremities, normal sensory      Assessment/Plan:       ICD-10-CM ICD-9-CM    1. Chronic bilateral low back pain without sciatica M54.5 724.2 XR SPINE LUMB 2 OR 3 V    G89.29 338.29    2. Medicare annual wellness visit, subsequent Z00.00 V70.0    3. Screening for depression Z13.31 V79.0 DEPRESSION SCREEN ANNUAL   4.  Upper back pain M54.9 724.5 CANCELED: XR SPINE THORAC 2 V   5. Myofascial pain M79.18 729.1 pregabalin (LYRICA) 75 mg capsule   6. Postmenopausal Z78.0 V49.81 DEXA BONE DENSITY STUDY AXIAL   7. Osteopenia, unspecified location M85.80 733.90 DEXA BONE DENSITY STUDY AXIAL       Follow-up and Dispositions    · Return in about 6 months (around 2/14/2020) for follow up on medications. Leonardo Ballard MD    Discussed the patient's BMI with her. The BMI follow up plan is as follows:     dietary management education, guidance, and counseling  encourage exercise  monitor weight  prescribed dietary intake    An After Visit Summary was printed and given to the patient. Discussed the patient's BMI with her. The BMI follow up plan is as follows:     dietary management education, guidance, and counseling  encourage exercise  monitor weight  prescribed dietary intake    An After Visit Summary was printed and given to the patient.

## 2019-08-14 NOTE — TELEPHONE ENCOUNTER
Please notify patient xrays of back with degenerative arthritis and degenerative disc disease. No fractures. No tumors.

## 2019-08-14 NOTE — PATIENT INSTRUCTIONS
Medicare Wellness Visit, Female The best way to live healthy is to have a lifestyle where you eat a well-balanced diet, exercise regularly, limit alcohol use, and quit all forms of tobacco/nicotine, if applicable. Regular preventive services are another way to keep healthy. Preventive services (vaccines, screening tests, monitoring & exams) can help personalize your care plan, which helps you manage your own care. Screening tests can find health problems at the earliest stages, when they are easiest to treat. [facility] follows the current, evidence-based guidelines published by the [facility] ([facility]) when recommending preventive services for our patients. Because we follow these guidelines, sometimes recommendations change over time as research supports it. (For example, mammograms used to be recommended annually. Even though Medicare will still pay for an annual mammogram, the newer guidelines recommend a mammogram every two years for women of average risk.) Of course, you and your doctor may decide to screen more often for some diseases, based on your risk and your health status. Preventive services for you include: - Medicare offers their members a free annual wellness visit, which is time for you and your primary care provider to discuss and plan for your preventive service needs. Take advantage of this benefit every year! 
-All adults over the age of 72 should receive the recommended pneumonia vaccines. Current USPSTF guidelines recommend a series of two vaccines for the best pneumonia protection.  
-All adults should have a flu vaccine yearly and a tetanus vaccine every 10 years. All adults age 61 and older should receive a shingles vaccine once in their lifetime.   
-A bone mass density test is recommended when a woman turns 65 to screen for osteoporosis. This test is only recommended one time, as a screening.  Some providers will use this same test as a disease monitoring tool if you already have osteoporosis. -All adults age 38-68 who are overweight should have a diabetes screening test once every three years.  
-Other screening tests and preventive services for persons with diabetes include: an eye exam to screen for diabetic retinopathy, a kidney function test, a foot exam, and stricter control over your cholesterol.  
-Cardiovascular screening for adults with routine risk involves an electrocardiogram (ECG) at intervals determined by your doctor.  
-Colorectal cancer screenings should be done for adults age 54-65 with no increased risk factors for colorectal cancer. There are a number of acceptable methods of screening for this type of cancer. Each test has its own benefits and drawbacks. Discuss with your doctor what is most appropriate for you during your annual wellness visit. The different tests include: colonoscopy (considered the best screening method), a fecal occult blood test, a fecal DNA test, and sigmoidoscopy. -Breast cancer screenings are recommended every other year for women of normal risk, age 54-69. 
-Cervical cancer screenings for women over age 72 are only recommended with certain risk factors.  
-All adults born between [facility] should be screened once for Hepatitis C. Here is a list of your current Health Maintenance items (your personalized list of preventive services) with a due date: 
Health Maintenance Due Topic Date Due  
 Annual Well Visit  05/22/2019  Flu Vaccine  08/01/2019 Office Policies Phone calls/patient messages: Please allow up to 24 hours for someone in the office to contact you about your call or message. Be mindful your provider may be out of the office or your message may require further review. We encourage you to use [employer] for your messages as this is a faster, more efficient way to communicate with our office Medication Refills: 
         
Prescription medications require 48-72 business hours to process. We encourage you to use [employer] for your refills. For controlled medications: Please allow 72 business hours to process. Certain medications may require you to  a written prescription at our office. NO narcotic/controlled medications will be prescribed after 4pm Monday through Friday or on weekends Form/Paperwork Completion: 
         
Please note a $25 fee may incur for all paperwork for completed by our providers. We ask that you allow 7-10 business days. Pre-payment is due prior to picking up/faxing the completed form. You may also download your forms to [employer] to have your doctor print off. Body Mass Index: Care Instructions Your Care Instructions Body mass index (BMI) can help you see if your weight is raising your risk for health problems. It uses a formula to compare how much you weigh with how tall you are. · A BMI lower than 18.5 is considered underweight. · A BMI between 18.5 and 24.9 is considered healthy. · A BMI between 25 and 29.9 is considered overweight. A BMI of 30 or higher is considered obese. If your BMI is in the normal range, it means that you have a lower risk for weight-related health problems. If your BMI is in the overweight or obese range, you may be at increased risk for weight-related health problems, such as high blood pressure, heart disease, stroke, arthritis or joint pain, and diabetes. If your BMI is in the underweight range, you may be at increased risk for health problems such as fatigue, lower protection (immunity) against illness, muscle loss, bone loss, hair loss, and hormone problems. BMI is just one measure of your risk for weight-related health problems. You may be at higher risk for health problems if you are not active, you eat an unhealthy diet, or you drink too much alcohol or use tobacco products. Follow-up care is a key part of your treatment and safety.  Be sure to make and go to all appointments, and call your doctor if you are having problems. It's also a good idea to know your test results and keep a list of the medicines you take. How can you care for yourself at home? · Practice healthy eating habits. This includes eating plenty of fruits, vegetables, whole grains, lean protein, and low-fat dairy. · If your doctor recommends it, get more exercise. Walking is a good choice. Bit by bit, increase the amount you walk every day. Try for at least 30 minutes on most days of the week. · Do not smoke. Smoking can increase your risk for health problems. If you need help quitting, talk to your doctor about stop-smoking programs and medicines. These can increase your chances of quitting for good. · Limit alcohol to 2 drinks a day for men and 1 drink a day for women. Too much alcohol can cause health problems. If you have a BMI higher than 25 · Your doctor may do other tests to check your risk for weight-related health problems. This may include measuring the distance around your waist. A waist measurement of more than 40 inches in men or 35 inches in women can increase the risk of weight-related health problems. · Talk with your doctor about steps you can take to stay healthy or improve your health. You may need to make lifestyle changes to lose weight and stay healthy, such as changing your diet and getting regular exercise. If you have a BMI lower than 18.5 · Your doctor may do other tests to check your risk for health problems. · Talk with your doctor about steps you can take to stay healthy or improve your health. You may need to make lifestyle changes to gain or maintain weight and stay healthy, such as getting more healthy foods in your diet and doing exercises to build muscle. Where can you learn more? Go to http://cirilo-aristeo.info/. Enter S176 in the search box to learn more about \"Body Mass Index: Care Instructions. \" Current as of: October 13, 2016 Content Version: 11.4 © 6302-5694 [employer]. Care instructions adapted under license by [employer] (which disclaims liability or warranty for this information). If you have questions about a medical condition or this instruction, always ask your healthcare professional. [address] any warranty or liability for your use of this information. Body Mass Index: Care Instructions Your Care Instructions Body mass index (BMI) can help you see if your weight is raising your risk for health problems. It uses a formula to compare how much you weigh with how tall you are. · A BMI lower than 18.5 is considered underweight. · A BMI between 18.5 and 24.9 is considered healthy. · A BMI between 25 and 29.9 is considered overweight. A BMI of 30 or higher is considered obese. If your BMI is in the normal range, it means that you have a lower risk for weight-related health problems. If your BMI is in the overweight or obese range, you may be at increased risk for weight-related health problems, such as high blood pressure, heart disease, stroke, arthritis or joint pain, and diabetes. If your BMI is in the underweight range, you may be at increased risk for health problems such as fatigue, lower protection (immunity) against illness, muscle loss, bone loss, hair loss, and hormone problems. BMI is just one measure of your risk for weight-related health problems. You may be at higher risk for health problems if you are not active, you eat an unhealthy diet, or you drink too much alcohol or use tobacco products. Follow-up care is a key part of your treatment and safety. Be sure to make and go to all appointments, and call your doctor if you are having problems. It's also a good idea to know your test results and keep a list of the medicines you take. How can you care for yourself at home? · Practice healthy eating habits.  This includes eating plenty of fruits, vegetables, whole grains, lean protein, and low-fat dairy. · If your doctor recommends it, get more exercise. Walking is a good choice. Bit by bit, increase the amount you walk every day. Try for at least 30 minutes on most days of the week. · Do not smoke. Smoking can increase your risk for health problems. If you need help quitting, talk to your doctor about stop-smoking programs and medicines. These can increase your chances of quitting for good. · Limit alcohol to 2 drinks a day for men and 1 drink a day for women. Too much alcohol can cause health problems. If you have a BMI higher than 25 · Your doctor may do other tests to check your risk for weight-related health problems. This may include measuring the distance around your waist. A waist measurement of more than 40 inches in men or 35 inches in women can increase the risk of weight-related health problems. · Talk with your doctor about steps you can take to stay healthy or improve your health. You may need to make lifestyle changes to lose weight and stay healthy, such as changing your diet and getting regular exercise. If you have a BMI lower than 18.5 · Your doctor may do other tests to check your risk for health problems. · Talk with your doctor about steps you can take to stay healthy or improve your health. You may need to make lifestyle changes to gain or maintain weight and stay healthy, such as getting more healthy foods in your diet and doing exercises to build muscle. Where can you learn more? Go to http://cirilo-aristeo.info/. Enter S176 in the search box to learn more about \"Body Mass Index: Care Instructions. \" Current as of: October 13, 2016 Content Version: 11.4 © 3249-3025 [employer]. Care instructions adapted under license by [employer] (which disclaims liability or warranty for this information).  If you have questions about a medical condition or this instruction, always ask your healthcare professional. [place] [address] any warranty or liability for your use of this information.

## 2019-08-14 NOTE — PROGRESS NOTES
This is the Subsequent Medicare Annual Wellness Exam, performed 12 months or more after the Initial AWV or the last Subsequent AWV    I have reviewed the patient's medical history in detail and updated the computerized patient record. History     Past Medical History:   Diagnosis Date    Anxiety     Chronic pain of both shoulders     Diverticulosis     GERD (gastroesophageal reflux disease)     H/O tubal ligation     Hyperlipidemia     Hypertension     Hypothyroidism     Menopause     Prediabetes     Vitamin D deficiency       Past Surgical History:   Procedure Laterality Date    HX APPENDECTOMY      HX BREAST AUGMENTATION  01/1993    HX BREAST BIOPSY Bilateral     right Breast 1 year and left breast 4 years ago.  HX DILATION AND CURETTAGE  1971-1976    x 3 due to [address] HX HEENT      HX HEMORRHOIDECTOMY      banded    HX INTRACRANIAL ANEURYSM REPAIR  11/1997    brain aneurysm clipping, [facility]    HX TONSIL AND ADENOIDECTOMY      IMPLANT BREAST SILICONE/EQ       Current Outpatient Medications   Medication Sig Dispense Refill    pregabalin (LYRICA) 75 mg capsule Take 1 Cap by mouth two (2) times a day. Max Daily Amount: 150 mg. 60 Cap 2    LORazepam (ATIVAN) 1 mg tablet Take one tablet at bedtime. 90 Tab 0    atorvastatin (LIPITOR) 40 mg tablet Take 1 Tab by mouth daily. 90 Tab 0    hydrALAZINE (APRESOLINE) 25 mg tablet TAKE ONE TABLET BY MOUTH THREE TIMES A  Tab 2    sertraline (ZOLOFT) 100 mg tablet TAKE ONE TABLET BY MOUTH DAILY 90 Tab 2    levothyroxine (SYNTHROID) 112 mcg tablet TAKE ONE TABLET BY MOUTH DAILY BEFORE BREAKFAST 90 Tab 2    cholecalciferol, VITAMIN D3, (VITAMIN D3) 5,000 unit tab tablet Take  by mouth daily.  losartan-hydroCHLOROthiazide (HYZAAR) 100-12.5 mg per tablet Take 1 Tab by mouth daily. DOSE CHANGE 90 Tab 3    pantoprazole (PROTONIX) 40 mg tablet Take 1 Tab by mouth daily. 90 Tab 3    aspirin 81 mg chewable tablet Take 81 mg by mouth daily.  docusate sodium (STOOL SOFTENER) 100 mg tab Take  by mouth. 2 tabs PO daily      magnesium oxide (MAG-OX) 400 mg tablet Take 400 mg by mouth daily.  cyanocobalamin (VITAMIN B-12) 1,000 mcg tablet Take 1,000 mcg by mouth daily.  triamcinolone acetonide (KENALOG) 0.1 % ointment        Allergies   Allergen Reactions    Adhesive Tape-Silicones Rash and Itching    Percocet [Oxycodone-Acetaminophen] Other (comments)     agitation     Family History   Problem Relation Age of Onset    Heart Disease Mother     Stroke Mother     Hypertension Mother     Diabetes Mother     Elevated Lipids Mother     Diabetes Father     Heart Disease Father     Hypertension Father     Elevated Lipids Father     Cancer Sister 36        breast cancer    Sleep Apnea Sister     Elevated Lipids Sister     Breast Cancer Sister 36    Heart Surgery Brother     Heart Disease Brother     Cancer Maternal Grandmother         colon cancer    Stroke Maternal Grandfather     Diabetes Paternal Grandmother     Cancer Paternal Grandfather         pancreatic cancer     Social History     Tobacco Use    Smoking status: Former Smoker     Packs/day: 1.00     Years: 30.00     Pack years: 30.00     Last attempt to quit: 3/23/2007     Years since quittin.4    Smokeless tobacco: Never Used   Substance Use Topics    Alcohol use:  Yes     Alcohol/week: 1.0 standard drinks     Types: 1 Glasses of wine per week     Comment: Once a day     Patient Active Problem List   Diagnosis Code    Optic neuropathy, right H46.9    Essential hypertension I10    Pure hypercholesterolemia E78.00    Acquired hypothyroidism E03.9    Osteopenia M85.80    Elevated glucose R73.09    Gastroesophageal reflux disease without esophagitis K21.9    Prediabetes R73.03    CKD (chronic kidney disease) stage 3, GFR 30-59 ml/min ([facility]) N18.3       Depression Risk Factor Screening:     3 most recent PHQ Screens 2019   Little interest or pleasure in doing things Not at all   Feeling down, depressed, irritable, or hopeless Not at all   Total Score PHQ 2 0     Alcohol Risk Factor Screening: You do not drink alcohol or very rarely. Functional Ability and Level of Safety:   Hearing Loss  Hearing is good. Activities of Daily Living  The home contains: no safety equipment. Patient does total self care    Fall Risk  Fall Risk Assessment, last 12 mths 8/14/2019   Able to walk? Yes   Fall in past 12 months? No   Fall with injury? -   Number of falls in past 12 months -   Fall Risk Score -       Abuse Screen  Patient is not abused    Cognitive Screening   Evaluation of Cognitive Function:  Has your family/caregiver stated any concerns about your memory: no  Normal    Patient Care Team   Patient Care Team:  Toshia Hartmann MD as PCP - General (Internal Medicine)    Assessment/Plan   Education and counseling provided:  Are appropriate based on today's review and evaluation    Diagnoses and all orders for this visit:    1. Chronic bilateral low back pain without sciatica  -     XR SPINE LUMB 2 OR 3 V; Future    2. Medicare annual wellness visit, subsequent    3. Screening for depression  -     DEPRESSION SCREEN ANNUAL    4. Upper back pain    5. Myofascial pain  -     pregabalin (LYRICA) 75 mg capsule; Take 1 Cap by mouth two (2) times a day. Max Daily Amount: 150 mg.    6. Postmenopausal  -     DEXA BONE DENSITY STUDY AXIAL; Future    7. Osteopenia, unspecified location  -     DEXA BONE DENSITY STUDY AXIAL; Future        There are no preventive care reminders to display for this patient.

## 2019-08-15 ENCOUNTER — TELEPHONE (OUTPATIENT)
Dept: INTERNAL MEDICINE CLINIC | Age: 79
End: 2019-08-15

## 2019-08-18 PROBLEM — N18.30 CKD (CHRONIC KIDNEY DISEASE) STAGE 3, GFR 30-59 ML/MIN (HCC): Status: ACTIVE | Noted: 2019-08-18

## 2019-09-09 ENCOUNTER — HOSPITAL ENCOUNTER (OUTPATIENT)
Dept: MAMMOGRAPHY | Age: 79
Discharge: HOME | End: 2019-09-09
Attending: FAMILY MEDICINE
Payer: MEDICARE

## 2019-09-09 DIAGNOSIS — M85.80 OSTEOPENIA, UNSPECIFIED LOCATION: ICD-10-CM

## 2019-09-09 DIAGNOSIS — Z78.0 POSTMENOPAUSAL: ICD-10-CM

## 2019-09-09 PROCEDURE — 77080 DXA BONE DENSITY AXIAL: CPT

## 2019-09-23 RX ORDER — PANTOPRAZOLE SODIUM 40 MG/1
TABLET, DELAYED RELEASE ORAL
Qty: 90 TAB | Refills: 2 | Status: SHIPPED | OUTPATIENT
Start: 2019-09-23 | End: 2020-06-21 | Stop reason: SDUPTHER

## 2019-09-24 NOTE — PROGRESS NOTES
Bone density testing shows osteopenia. Recommend weight bearing exercise, like walking. Calcium 1200mg daily, most from foods, less from supplement. Vitamin D3 2,000 iu once a day. Recheck bone density test in 2 years.

## 2019-10-06 DIAGNOSIS — E78.00 PURE HYPERCHOLESTEROLEMIA: ICD-10-CM

## 2019-10-06 RX ORDER — ATORVASTATIN CALCIUM 40 MG/1
TABLET, FILM COATED ORAL
Qty: 90 TAB | Refills: 0 | Status: SHIPPED | OUTPATIENT
Start: 2019-10-06 | End: 2020-01-05 | Stop reason: SDUPTHER

## 2019-10-17 ENCOUNTER — DOCUMENTATION ONLY (OUTPATIENT)
Dept: INTERNAL MEDICINE CLINIC | Age: 79
End: 2019-10-17

## 2019-10-17 DIAGNOSIS — M79.18 MYOFASCIAL PAIN: ICD-10-CM

## 2019-10-17 DIAGNOSIS — F41.9 ANXIETY: ICD-10-CM

## 2019-10-17 RX ORDER — PREGABALIN 75 MG/1
75 CAPSULE ORAL 2 TIMES DAILY
Qty: 60 CAP | Refills: 2 | Status: SHIPPED | OUTPATIENT
Start: 2019-10-17 | End: 2020-01-17 | Stop reason: SDUPTHER

## 2019-10-17 RX ORDER — LORAZEPAM 1 MG/1
TABLET ORAL
Qty: 90 TAB | Refills: 0 | Status: SHIPPED | OUTPATIENT
Start: 2019-10-17 | End: 2020-01-17 | Stop reason: SDUPTHER

## 2019-10-17 NOTE — TELEPHONE ENCOUNTER
PCP: Aretha Hernandez MD    Last appt: 8/14/2019  Future Appointments   Date Time Provider Summer Wilkins   2/14/2020  2:00 PM Aretha Hernandez MD [facility] 87       Requested Prescriptions     Pending Prescriptions Disp Refills    LORazepam (ATIVAN) 1 mg tablet 90 Tab 0     Sig: Take one tablet at bedtime.  pregabalin (LYRICA) 75 mg capsule 60 Cap 2     Sig: Take 1 Cap by mouth two (2) times a day. Max Daily Amount: 150 mg.

## 2019-10-17 NOTE — PROGRESS NOTES
The following prescription was faxed into pt's [address] w/ confirmation received:     LORazepam (ATIVAN) 1 mg tablet [[id number]]     Order Details   Dose, Route, Frequency: As Directed    Dispense Quantity: 90 Tab Refills: 0 Fills remaining: --           Sig: Take one tablet at bedtime.          Written Date: 10/17/19 Expiration Date: --     Start Date: 10/17/19 End Date: --            Ordering Provider: -- LEE #:  -- NPI:  --    Authorizing Provider: Poli Bethea MD LEE #:  [id number] NPI:  [id number]    Ordering User:  Poli Bethea MD            Diagnosis Association: Anxiety (F41.9)      Original Order:  LORazepam (ATIVAN) 1 mg tablet [[id number]]      Pharmacy:  [facility] [address] [place] [address]  #:  [id number]     Pharmacy Comments:  --          Fill quantity remaining:  -- Fill quantity used:  -- Next fill due: --       Outpatient Medication Detail      Disp Refills Start End    LORazepam (ATIVAN) 1 mg tablet 90 Tab 0 10/17/2019     Sig: Take one tablet at bedtime. Class: Print    Priority and Order Details     Priority Class    Routine Print    Warnings History     Total number of overridden warnings: 1   Full Warnings History   Pharmacy     [address]       pregabalin (LYRICA) 75 mg capsule [[id number]]     Order Details   Dose: 75 mg Route: Oral Frequency: 2 TIMES DAILY   Dispense Quantity: 60 Cap Refills: 2 Fills remaining: --           Sig: Take 1 Cap by mouth two (2) times a day.  Max Daily Amount: 150 mg.          Written Date: 10/17/19 Expiration Date: --     Start Date: 10/17/19 End Date: --            Ordering Provider: -- LEE #:  -- NPI:  --    Authorizing Provider: Poli Bethea MD LEE #:  [id number] NPI:  [id number]    Ordering User:  Poli Bethea MD            Diagnosis Association: Myofascial pain (M79.18)      Original Order:  pregabalin (LYRICA) 75 mg capsule [[id number]]      Pharmacy:  [facility]  [address], [address] [place] [address]  #:  [id number]     Pharmacy Comments:  --          Fill quantity remaining:  -- Fill quantity used:  -- Next fill due: --       Outpatient Medication Detail      Disp Refills Start End    pregabalin (LYRICA) 75 mg capsule 60 Cap 2 10/17/2019     Sig - Route: Take 1 Cap by mouth two (2) times a day.  Max Daily Amount: 150 mg. - Oral    Class: Print    Lab Test Results     Component Date/Time Value Range & Unit Status   AST (SGOT) 08/07/19 0906 25 0 - 40 IU/L Final   Priority and Order Details     Priority Class    Routine Print    Warnings History     No Interaction Warnings Liliana Alexis [address], [address] [facility] [place]

## 2019-11-11 DIAGNOSIS — I10 ESSENTIAL HYPERTENSION: ICD-10-CM

## 2019-11-11 RX ORDER — LOSARTAN POTASSIUM AND HYDROCHLOROTHIAZIDE 12.5; 1 MG/1; MG/1
TABLET ORAL
Qty: 90 TAB | Refills: 2 | Status: SHIPPED | OUTPATIENT
Start: 2019-11-11 | End: 2020-08-14 | Stop reason: SDUPTHER

## 2019-11-25 DIAGNOSIS — E03.9 ACQUIRED HYPOTHYROIDISM: ICD-10-CM

## 2019-11-25 RX ORDER — LEVOTHYROXINE SODIUM 112 UG/1
112 TABLET ORAL
Qty: 90 TAB | Refills: 2 | Status: SHIPPED | OUTPATIENT
Start: 2019-11-25 | End: 2020-08-14 | Stop reason: SDUPTHER

## 2019-11-25 NOTE — TELEPHONE ENCOUNTER
PCP: Donald Smith MD    Last appt: 8/14/2019  Future Appointments   Date Time Provider Summer Wilkins   2/14/2020  2:00 PM MD [facility] 87       Requested Prescriptions     Pending Prescriptions Disp Refills    levothyroxine (SYNTHROID) 112 mcg tablet 90 Tab 2     Sig: TAKE ONE TABLET BY MOUTH DAILY BEFORE BREAKFAST

## 2020-01-05 DIAGNOSIS — E78.00 PURE HYPERCHOLESTEROLEMIA: ICD-10-CM

## 2020-01-06 RX ORDER — ATORVASTATIN CALCIUM 40 MG/1
40 TABLET, FILM COATED ORAL DAILY
Qty: 90 TAB | Refills: 0 | Status: SHIPPED | OUTPATIENT
Start: 2020-01-06 | End: 2020-03-24 | Stop reason: SDUPTHER

## 2020-01-06 NOTE — TELEPHONE ENCOUNTER
PCP: Sandra Shea MD    Last appt: 8/14/2019  Future Appointments   Date Time Provider Summer Wilkins   2/5/2020  1:15 PM [facility] [address] [place]   2/14/2020  2:00 PM [facility]       Requested Prescriptions     Pending Prescriptions Disp Refills    atorvastatin (LIPITOR) 40 mg tablet 90 Tab 0

## 2020-01-17 DIAGNOSIS — F41.9 ANXIETY: ICD-10-CM

## 2020-01-17 DIAGNOSIS — M79.18 MYOFASCIAL PAIN: ICD-10-CM

## 2020-01-17 RX ORDER — LORAZEPAM 1 MG/1
TABLET ORAL
Qty: 90 TAB | Refills: 0 | Status: SHIPPED | OUTPATIENT
Start: 2020-01-17 | End: 2020-04-16 | Stop reason: SDUPTHER

## 2020-01-17 RX ORDER — PREGABALIN 75 MG/1
75 CAPSULE ORAL 2 TIMES DAILY
Qty: 60 CAP | Refills: 2 | Status: SHIPPED | OUTPATIENT
Start: 2020-01-17 | End: 2020-04-29 | Stop reason: SDUPTHER

## 2020-01-17 NOTE — TELEPHONE ENCOUNTER
PCP: Bryson Cortez MD    Last appt: 8/14/2019  Future Appointments   Date Time Provider Summer Wilkins   2/5/2020  1:15 PM [facility] [address]   2/14/2020  2:00 PM Bryson Cortez MD [address]       Requested Prescriptions     Pending Prescriptions Disp Refills    LORazepam (ATIVAN) 1 mg tablet 90 Tab 0     Sig: Take one tablet at bedtime.  pregabalin (LYRICA) 75 mg capsule 60 Cap 2     Sig: Take 1 Cap by mouth two (2) times a day. Max Daily Amount: 150 mg.

## 2020-01-29 ENCOUNTER — PATIENT MESSAGE (OUTPATIENT)
Dept: INTERNAL MEDICINE CLINIC | Age: 80
End: 2020-01-29

## 2020-01-29 DIAGNOSIS — R73.09 ELEVATED GLUCOSE: ICD-10-CM

## 2020-01-29 DIAGNOSIS — E03.9 ACQUIRED HYPOTHYROIDISM: ICD-10-CM

## 2020-01-29 DIAGNOSIS — E78.00 PURE HYPERCHOLESTEROLEMIA: Primary | ICD-10-CM

## 2020-01-29 DIAGNOSIS — I10 ESSENTIAL HYPERTENSION: ICD-10-CM

## 2020-01-29 NOTE — TELEPHONE ENCOUNTER
----- Message from Gurvinder Rmasey LPN sent at 7/42/3675 11:45 AM EST -----  Regarding: FW: Non-Urgent Medical Question  Contact: [phone]    ----- Message -----  From: Elieser Osborne  Sent: 1/29/2020  11:17 AM EST  To: [facility] Nurse Pool  Subject: Non-Urgent Medical Question                      Hi Dr. Alfa Haines, could you please put in an order for fasting blood work that I can have done before I see you on 2/14/2020? Thank you in advance.

## 2020-02-05 ENCOUNTER — HOSPITAL ENCOUNTER (OUTPATIENT)
Dept: MAMMOGRAPHY | Age: 80
Discharge: HOME | End: 2020-02-05
Attending: FAMILY MEDICINE
Payer: MEDICARE

## 2020-02-05 DIAGNOSIS — Z12.31 VISIT FOR SCREENING MAMMOGRAM: ICD-10-CM

## 2020-02-05 PROCEDURE — 77063 BREAST TOMOSYNTHESIS BI: CPT

## 2020-02-06 ENCOUNTER — HOSPITAL ENCOUNTER (OUTPATIENT)
Dept: LAB | Age: 80
Discharge: HOME | End: 2020-02-06
Payer: MEDICARE

## 2020-02-06 DIAGNOSIS — E78.00 PURE HYPERCHOLESTEROLEMIA: ICD-10-CM

## 2020-02-06 DIAGNOSIS — E03.9 ACQUIRED HYPOTHYROIDISM: ICD-10-CM

## 2020-02-06 DIAGNOSIS — I10 ESSENTIAL HYPERTENSION: ICD-10-CM

## 2020-02-06 DIAGNOSIS — R73.09 ELEVATED GLUCOSE: ICD-10-CM

## 2020-02-06 PROCEDURE — 36415 COLL VENOUS BLD VENIPUNCTURE: CPT

## 2020-02-06 PROCEDURE — 83036 HEMOGLOBIN GLYCOSYLATED A1C: CPT

## 2020-02-06 PROCEDURE — 80053 COMPREHEN METABOLIC PANEL: CPT

## 2020-02-06 PROCEDURE — 84443 ASSAY THYROID STIM HORMONE: CPT

## 2020-02-06 PROCEDURE — 82043 UR ALBUMIN QUANTITATIVE: CPT

## 2020-02-06 PROCEDURE — 80061 LIPID PANEL: CPT

## 2020-02-07 LAB
ALBUMIN SERPL-MCNC: 4.3 G/DL (ref 3.7–4.7)
ALBUMIN/CREAT UR: <7 MG/G CREAT (ref 0–29)
ALBUMIN/GLOB SERPL: 2 {RATIO} (ref 1.2–2.2)
ALP SERPL-CCNC: 66 IU/L (ref 39–117)
ALT SERPL-CCNC: 21 IU/L (ref 0–32)
AST SERPL-CCNC: 25 IU/L (ref 0–40)
BILIRUB SERPL-MCNC: 0.3 MG/DL (ref 0–1.2)
BUN SERPL-MCNC: 13 MG/DL (ref 8–27)
BUN/CREAT SERPL: 13 (ref 12–28)
CALCIUM SERPL-MCNC: 10 MG/DL (ref 8.7–10.3)
CHLORIDE SERPL-SCNC: 101 MMOL/L (ref 96–106)
CHOLEST SERPL-MCNC: 160 MG/DL (ref 100–199)
CO2 SERPL-SCNC: 26 MMOL/L (ref 20–29)
CREAT SERPL-MCNC: 1 MG/DL (ref 0.57–1)
CREAT UR-MCNC: 42.4 MG/DL
GLOBULIN SER CALC-MCNC: 2.2 G/DL (ref 1.5–4.5)
GLUCOSE SERPL-MCNC: 113 MG/DL (ref 65–99)
HBA1C MFR BLD: 5.9 % (ref 4.8–5.6)
HDLC SERPL-MCNC: 59 MG/DL
LDLC SERPL CALC-MCNC: 77 MG/DL (ref 0–99)
MICROALBUMIN UR-MCNC: <3 UG/ML
POTASSIUM SERPL-SCNC: 4.8 MMOL/L (ref 3.5–5.2)
PROT SERPL-MCNC: 6.5 G/DL (ref 6–8.5)
SODIUM SERPL-SCNC: 143 MMOL/L (ref 134–144)
TRIGL SERPL-MCNC: 121 MG/DL (ref 0–149)
TSH SERPL DL<=0.005 MIU/L-ACNC: 0.77 UIU/ML (ref 0.45–4.5)
VLDLC SERPL CALC-MCNC: 24 MG/DL (ref 5–40)

## 2020-02-14 ENCOUNTER — OFFICE VISIT (OUTPATIENT)
Dept: INTERNAL MEDICINE CLINIC | Age: 80
End: 2020-02-14

## 2020-02-14 VITALS
DIASTOLIC BLOOD PRESSURE: 80 MMHG | HEART RATE: 71 BPM | BODY MASS INDEX: 30.65 KG/M2 | RESPIRATION RATE: 16 BRPM | WEIGHT: 173 LBS | HEIGHT: 63 IN | OXYGEN SATURATION: 96 % | SYSTOLIC BLOOD PRESSURE: 146 MMHG | TEMPERATURE: 97.8 F

## 2020-02-14 DIAGNOSIS — E78.00 PURE HYPERCHOLESTEROLEMIA: ICD-10-CM

## 2020-02-14 DIAGNOSIS — R73.09 ELEVATED GLUCOSE: ICD-10-CM

## 2020-02-14 DIAGNOSIS — I10 ESSENTIAL HYPERTENSION: Primary | ICD-10-CM

## 2020-02-14 DIAGNOSIS — E55.9 VITAMIN D DEFICIENCY: ICD-10-CM

## 2020-02-14 DIAGNOSIS — F41.9 ANXIETY: ICD-10-CM

## 2020-02-14 DIAGNOSIS — E03.9 ACQUIRED HYPOTHYROIDISM: ICD-10-CM

## 2020-02-14 DIAGNOSIS — M79.18 MYOFASCIAL PAIN: ICD-10-CM

## 2020-02-14 RX ORDER — HYDRALAZINE HYDROCHLORIDE 25 MG/1
TABLET, FILM COATED ORAL
Qty: 270 TAB | Refills: 2 | Status: SHIPPED | OUTPATIENT
Start: 2020-02-14 | End: 2020-08-14 | Stop reason: SDUPTHER

## 2020-02-14 RX ORDER — LEVOTHYROXINE SODIUM 112 UG/1
112 TABLET ORAL
Qty: 90 TAB | Refills: 2 | Status: CANCELLED | OUTPATIENT
Start: 2020-02-14

## 2020-02-14 RX ORDER — PREGABALIN 75 MG/1
75 CAPSULE ORAL 2 TIMES DAILY
Qty: 60 CAP | Refills: 2 | Status: CANCELLED | OUTPATIENT
Start: 2020-02-14

## 2020-02-14 RX ORDER — ATORVASTATIN CALCIUM 40 MG/1
40 TABLET, FILM COATED ORAL DAILY
Qty: 90 TAB | Refills: 0 | Status: CANCELLED | OUTPATIENT
Start: 2020-02-14

## 2020-02-14 RX ORDER — SERTRALINE HYDROCHLORIDE 100 MG/1
TABLET, FILM COATED ORAL
Qty: 90 TAB | Refills: 2 | Status: SHIPPED | OUTPATIENT
Start: 2020-02-14 | End: 2020-08-14 | Stop reason: SDUPTHER

## 2020-02-14 RX ORDER — PANTOPRAZOLE SODIUM 40 MG/1
TABLET, DELAYED RELEASE ORAL
Qty: 90 TAB | Refills: 2 | Status: CANCELLED | OUTPATIENT
Start: 2020-02-14

## 2020-02-14 RX ORDER — LOSARTAN POTASSIUM AND HYDROCHLOROTHIAZIDE 12.5; 1 MG/1; MG/1
TABLET ORAL
Qty: 90 TAB | Refills: 2 | Status: CANCELLED | OUTPATIENT
Start: 2020-02-14

## 2020-02-14 RX ORDER — LORAZEPAM 1 MG/1
TABLET ORAL
Qty: 90 TAB | Refills: 0 | Status: CANCELLED | OUTPATIENT
Start: 2020-02-14

## 2020-02-14 NOTE — PATIENT INSTRUCTIONS
Office Policies Phone calls/patient messages: Please allow up to 24 hours for someone in the office to contact you about your call or message. Be mindful your provider may be out of the office or your message may require further review. We encourage you to use [facility] for your messages as this is a faster, more efficient way to communicate with our office Medication Refills: 
         
Prescription medications require 48-72 business hours to process. We encourage you to use [facility] for your refills. For controlled medications: Please allow 72 business hours to process. Certain medications may require you to  a written prescription at our office. NO narcotic/controlled medications will be prescribed after 4pm Monday through Friday or on weekends Form/Paperwork Completion: 
         
Please note a $25 fee may incur for all paperwork for completed by our providers. We ask that you allow 7-10 business days. Pre-payment is due prior to picking up/faxing the completed form. You may also download your forms to [facility] to have your doctor print off. Body Mass Index: Care Instructions Your Care Instructions Body mass index (BMI) can help you see if your weight is raising your risk for health problems. It uses a formula to compare how much you weigh with how tall you are. · A BMI lower than 18.5 is considered underweight. · A BMI between 18.5 and 24.9 is considered healthy. · A BMI between 25 and 29.9 is considered overweight. A BMI of 30 or higher is considered obese. If your BMI is in the normal range, it means that you have a lower risk for weight-related health problems. If your BMI is in the overweight or obese range, you may be at increased risk for weight-related health problems, such as high blood pressure, heart disease, stroke, arthritis or joint pain, and diabetes.  If your BMI is in the underweight range, you may be at increased risk for health problems such as fatigue, lower protection (immunity) against illness, muscle loss, bone loss, hair loss, and hormone problems. BMI is just one measure of your risk for weight-related health problems. You may be at higher risk for health problems if you are not active, you eat an unhealthy diet, or you drink too much alcohol or use tobacco products. Follow-up care is a key part of your treatment and safety. Be sure to make and go to all appointments, and call your doctor if you are having problems. It's also a good idea to know your test results and keep a list of the medicines you take. How can you care for yourself at home? · Practice healthy eating habits. This includes eating plenty of fruits, vegetables, whole grains, lean protein, and low-fat dairy. · If your doctor recommends it, get more exercise. Walking is a good choice. Bit by bit, increase the amount you walk every day. Try for at least 30 minutes on most days of the week. · Do not smoke. Smoking can increase your risk for health problems. If you need help quitting, talk to your doctor about stop-smoking programs and medicines. These can increase your chances of quitting for good. · Limit alcohol to 2 drinks a day for men and 1 drink a day for women. Too much alcohol can cause health problems. If you have a BMI higher than 25 · Your doctor may do other tests to check your risk for weight-related health problems. This may include measuring the distance around your waist. A waist measurement of more than 40 inches in men or 35 inches in women can increase the risk of weight-related health problems. · Talk with your doctor about steps you can take to stay healthy or improve your health. You may need to make lifestyle changes to lose weight and stay healthy, such as changing your diet and getting regular exercise. If you have a BMI lower than 18.5 · Your doctor may do other tests to check your risk for health problems. · Talk with your doctor about steps you can take to stay healthy or improve your health. You may need to make lifestyle changes to gain or maintain weight and stay healthy, such as getting more healthy foods in your diet and doing exercises to build muscle. Where can you learn more? Go to http://cirilo-aristeo.info/. Enter S176 in the search box to learn more about \"Body Mass Index: Care Instructions. \" Current as of: October 13, 2016 Content Version: 11.4 © 4998-1344 [employer]. Care instructions adapted under license by [employer] (which disclaims liability or warranty for this information). If you have questions about a medical condition or this instruction, always ask your healthcare professional. [address] any warranty or liability for your use of this information.

## 2020-02-14 NOTE — PROGRESS NOTES
Jared Barnett is a 67 y.o. female who presents for followup. Mammogram Feb 5 normal. Asking for breast exam.  No symptoms. Reports lower back pain. Reports pain in shoulders and upper back. No neuro sx. No weakness. Stretches daily. Uses heat and tylenol. Stopped lipitor in the past, no change in symptoms. To get a massage. Saw specialist for shoulder pain, had PT, felt worse.      Treated for HTN.    Blood pressure controlled. Creatinine 1.0. LDL 77  On lipitor 40mg once a day, no myalgias.      Prediabetic  5.9%. Trying to follow diet.       Treated for hypothyroidism.  TSH at goal.      On sertraline, mood is stable    Reports a fall recently, hit right face and right hand. DEXA scan osteopenia. On vitamin D daily.           Past Medical History:   Diagnosis Date    Anxiety     Chronic pain of both shoulders     Diverticulosis     GERD (gastroesophageal reflux disease)     H/O tubal ligation     Hyperlipidemia     Hypertension     Hypothyroidism     Menopause     Prediabetes     Vitamin D deficiency        Family History   Problem Relation Age of Onset    Heart Disease Mother     Stroke Mother     Hypertension Mother     Diabetes Mother     Elevated Lipids Mother     Diabetes Father     Heart Disease Father     Hypertension Father     Elevated Lipids Father     Cancer Sister 36        breast cancer    Sleep Apnea Sister     Elevated Lipids Sister     Breast Cancer Sister 36    Heart Surgery Brother     Heart Disease Brother     Cancer Maternal Grandmother         colon cancer    Stroke Maternal Grandfather     Diabetes Paternal Grandmother     Cancer Paternal Grandfather         pancreatic cancer       Social History     Socioeconomic History    Marital status:      Spouse name: Not on file    Number of children: Not on file    Years of education: Not on file    Highest education level: Not on file   Occupational History    Not on file   Social Needs  Financial resource strain: Not on [employer] insecurity:     Worry: Not on file     Inability: Not on file    Transportation needs:     Medical: Not on file     Non-medical: Not on file   Tobacco Use    Smoking status: Former Smoker     Packs/day: 1.00     Years: 30.00     Pack years: 30.00     Last attempt to quit: 3/23/2007     Years since quittin.9    Smokeless tobacco: Never Used   Substance and Sexual Activity    Alcohol use: Yes     Alcohol/week: 1.0 standard drinks     Types: 1 Glasses of wine per week     Comment: Once a day    Drug use: No    Sexual activity: Not Currently     Partners: Male     Birth control/protection: None   Lifestyle    Physical activity:     Days per week: Not on file     Minutes per session: Not on file    Stress: Not on file   Relationships    Social connections:     Talks on phone: Not on file     Gets together: Not on file     Attends Orthodox service: Not on file     Active member of club or organization: Not on file     Attends meetings of clubs or organizations: Not on file     Relationship status: Not on file    Intimate partner violence:     Fear of current or ex partner: Not on file     Emotionally abused: Not on file     Physically abused: Not on file     Forced sexual activity: Not on file   Other Topics Concern    Not on file   Social History Narrative    Not on file       Current Outpatient Medications on File Prior to Visit   Medication Sig Dispense Refill    LORazepam (ATIVAN) 1 mg tablet Take one tablet at bedtime. 90 Tab 0    pregabalin (LYRICA) 75 mg capsule Take 1 Cap by mouth two (2) times a day. Max Daily Amount: 150 mg. 60 Cap 2    atorvastatin (LIPITOR) 40 mg tablet Take 1 Tab by mouth daily. 90 Tab 0    levothyroxine (SYNTHROID) 112 mcg tablet Take 1 Tab by mouth Daily (before breakfast).  TAKE ONE TABLET BY MOUTH DAILY BEFORE BREAKFAST 90 Tab 2    losartan-hydroCHLOROthiazide (HYZAAR) 100-12.5 mg per tablet TAKE ONE TABLET BY MOUTH DAILY 90 Tab 2    pantoprazole (PROTONIX) 40 mg tablet TAKE ONE TABLET BY MOUTH DAILY 90 Tab 2    cholecalciferol, VITAMIN D3, (VITAMIN D3) 5,000 unit tab tablet Take  by mouth daily.  aspirin 81 mg chewable tablet Take 81 mg by mouth daily.  docusate sodium (STOOL SOFTENER) 100 mg tab Take  by mouth. 2 tabs PO daily      magnesium oxide (MAG-OX) 400 mg tablet Take 400 mg by mouth daily.  cyanocobalamin (VITAMIN B-12) 1,000 mcg tablet Take 1,000 mcg by mouth daily. No current facility-administered medications on file prior to visit. Review of Systems  Pertinent items are noted in HPI. Objective:     Visit Vitals  /80 (BP 1 Location: Left arm, BP Patient Position: Sitting)   Pulse 71   Temp 97.8 °F (36.6 °C) (Oral)   Resp 16   Ht 5' 3\" (1.6 m)   Wt 173 lb (78.5 kg)   SpO2 96%   BMI 30.65 kg/m²     Gen: well appearing female  HEENT:   PERRL,normal conjunctiva. External ear and canals normal, TMs no opacification or erythema,  OP no erythema, no exudates, MMM  Neck:  Supple. Thyroid normal size, nontender, without nodules. No masses or LAD  Resp:  No wheezing, no rhonchi, no rales. CV:  RRR, normal S1S2, no murmur. GI: soft, nontender, without masses. No hepatosplenomegaly. Extrem:  +2 pulses, no edema, warm distally  Breasts- no axillary LAD, no masses, normal nipples without discharge      Assessment/Plan:       ICD-10-CM ICD-9-CM    1. Essential hypertension I10 401.9 hydrALAZINE (APRESOLINE) 25 mg tablet   2. Anxiety F41.9 300.00    3. Myofascial pain M79.18 729.1    4. Pure hypercholesterolemia Y85.72 944.6 METABOLIC PANEL, COMPREHENSIVE      LIPID PANEL   5. Acquired hypothyroidism E03.9 244.9 TSH 3RD GENERATION   6. Vitamin D deficiency E55.9 268.9 VITAMIN D, 25 HYDROXY   7. Elevated glucose R73.09 790.29 HEMOGLOBIN A1C W/O EAG       Follow-up and Dispositions    · Return in about 6 months (around 8/14/2020) for medicare wellness/fasting labs prior.          Chantelle Guerra Olga Mahoney MD    Discussed the patient's BMI with her. The BMI follow up plan is as follows:     dietary management education, guidance, and counseling  encourage exercise  monitor weight  prescribed dietary intake    An After Visit Summary was printed and given to the patient.

## 2020-03-24 DIAGNOSIS — E78.00 PURE HYPERCHOLESTEROLEMIA: ICD-10-CM

## 2020-03-25 RX ORDER — ATORVASTATIN CALCIUM 40 MG/1
40 TABLET, FILM COATED ORAL DAILY
Qty: 90 TAB | Refills: 0 | Status: SHIPPED | OUTPATIENT
Start: 2020-03-25 | End: 2020-06-21 | Stop reason: SDUPTHER

## 2020-04-16 DIAGNOSIS — F41.9 ANXIETY: ICD-10-CM

## 2020-04-17 RX ORDER — LORAZEPAM 1 MG/1
TABLET ORAL
Qty: 90 TAB | Refills: 0 | Status: SHIPPED | OUTPATIENT
Start: 2020-04-17 | End: 2020-07-17 | Stop reason: SDUPTHER

## 2020-04-29 DIAGNOSIS — M79.18 MYOFASCIAL PAIN: ICD-10-CM

## 2020-04-30 RX ORDER — PREGABALIN 75 MG/1
75 CAPSULE ORAL 2 TIMES DAILY
Qty: 60 CAP | Refills: 2 | Status: SHIPPED | OUTPATIENT
Start: 2020-04-30 | End: 2020-06-07 | Stop reason: SDUPTHER

## 2020-06-07 DIAGNOSIS — M79.18 MYOFASCIAL PAIN: ICD-10-CM

## 2020-06-08 RX ORDER — PREGABALIN 75 MG/1
75 CAPSULE ORAL 2 TIMES DAILY
Qty: 60 CAP | Refills: 2 | Status: SHIPPED | OUTPATIENT
Start: 2020-06-08 | End: 2020-09-18 | Stop reason: SDUPTHER

## 2020-06-21 DIAGNOSIS — E78.00 PURE HYPERCHOLESTEROLEMIA: ICD-10-CM

## 2020-06-22 RX ORDER — PANTOPRAZOLE SODIUM 40 MG/1
40 TABLET, DELAYED RELEASE ORAL DAILY
Qty: 90 TAB | Refills: 2 | Status: SHIPPED | OUTPATIENT
Start: 2020-06-22

## 2020-06-22 RX ORDER — ATORVASTATIN CALCIUM 40 MG/1
40 TABLET, FILM COATED ORAL DAILY
Qty: 90 TAB | Refills: 0 | Status: SHIPPED | OUTPATIENT
Start: 2020-06-22 | End: 2020-09-18 | Stop reason: SDUPTHER

## 2020-07-17 DIAGNOSIS — F41.9 ANXIETY: ICD-10-CM

## 2020-07-17 RX ORDER — LORAZEPAM 1 MG/1
TABLET ORAL
Qty: 90 TAB | Refills: 0 | Status: SHIPPED | OUTPATIENT
Start: 2020-07-17 | End: 2020-10-15 | Stop reason: SDUPTHER

## 2020-07-17 NOTE — TELEPHONE ENCOUNTER
PCP: Vashti Uribe MD    Last appt: 2/14/2020  Future Appointments   Date Time Provider Summer Wilkins   8/14/2020  1:30 PM Vashti Uribe MD [facility]       Requested Prescriptions     Pending Prescriptions Disp Refills    LORazepam (ATIVAN) 1 mg tablet 90 Tab 0     Sig: Take one tablet at bedtime.

## 2020-08-05 ENCOUNTER — HOSPITAL ENCOUNTER (OUTPATIENT)
Dept: LAB | Age: 80
Discharge: HOME | End: 2020-08-05
Payer: MEDICARE

## 2020-08-05 PROCEDURE — 80053 COMPREHEN METABOLIC PANEL: CPT

## 2020-08-05 PROCEDURE — 83036 HEMOGLOBIN GLYCOSYLATED A1C: CPT

## 2020-08-05 PROCEDURE — 84443 ASSAY THYROID STIM HORMONE: CPT

## 2020-08-05 PROCEDURE — 82306 VITAMIN D 25 HYDROXY: CPT

## 2020-08-05 PROCEDURE — 36415 COLL VENOUS BLD VENIPUNCTURE: CPT

## 2020-08-05 PROCEDURE — 80061 LIPID PANEL: CPT

## 2020-08-06 LAB
25(OH)D3+25(OH)D2 SERPL-MCNC: 47.3 NG/ML (ref 30–100)
ALBUMIN SERPL-MCNC: 4.4 G/DL (ref 3.7–4.7)
ALBUMIN/GLOB SERPL: 1.8 {RATIO} (ref 1.2–2.2)
ALP SERPL-CCNC: 70 IU/L (ref 39–117)
ALT SERPL-CCNC: 21 IU/L (ref 0–32)
AST SERPL-CCNC: 27 IU/L (ref 0–40)
BILIRUB SERPL-MCNC: 0.4 MG/DL (ref 0–1.2)
BUN SERPL-MCNC: 13 MG/DL (ref 8–27)
BUN/CREAT SERPL: 13 (ref 12–28)
CALCIUM SERPL-MCNC: 9.8 MG/DL (ref 8.7–10.3)
CHLORIDE SERPL-SCNC: 101 MMOL/L (ref 96–106)
CHOLEST SERPL-MCNC: 178 MG/DL (ref 100–199)
CO2 SERPL-SCNC: 24 MMOL/L (ref 20–29)
CREAT SERPL-MCNC: 1.03 MG/DL (ref 0.57–1)
GLOBULIN SER CALC-MCNC: 2.5 G/DL (ref 1.5–4.5)
GLUCOSE SERPL-MCNC: 123 MG/DL (ref 65–99)
HBA1C MFR BLD: 6 % (ref 4.8–5.6)
HDLC SERPL-MCNC: 64 MG/DL
LDLC SERPL CALC-MCNC: 71 MG/DL (ref 0–99)
POTASSIUM SERPL-SCNC: 4.4 MMOL/L (ref 3.5–5.2)
PROT SERPL-MCNC: 6.9 G/DL (ref 6–8.5)
SODIUM SERPL-SCNC: 141 MMOL/L (ref 134–144)
TRIGL SERPL-MCNC: 213 MG/DL (ref 0–149)
TSH SERPL DL<=0.005 MIU/L-ACNC: 3.99 UIU/ML (ref 0.45–4.5)
VLDLC SERPL CALC-MCNC: 43 MG/DL (ref 5–40)

## 2020-08-14 ENCOUNTER — VIRTUAL VISIT (OUTPATIENT)
Dept: INTERNAL MEDICINE CLINIC | Age: 80
End: 2020-08-14
Payer: MEDICARE

## 2020-08-14 DIAGNOSIS — I10 ESSENTIAL HYPERTENSION: ICD-10-CM

## 2020-08-14 DIAGNOSIS — E55.9 VITAMIN D DEFICIENCY: ICD-10-CM

## 2020-08-14 DIAGNOSIS — E03.9 ACQUIRED HYPOTHYROIDISM: ICD-10-CM

## 2020-08-14 DIAGNOSIS — F41.9 ANXIETY: Primary | ICD-10-CM

## 2020-08-14 DIAGNOSIS — M79.18 MYOFASCIAL PAIN: ICD-10-CM

## 2020-08-14 DIAGNOSIS — Z13.31 SCREENING FOR DEPRESSION: ICD-10-CM

## 2020-08-14 DIAGNOSIS — E78.00 PURE HYPERCHOLESTEROLEMIA: ICD-10-CM

## 2020-08-14 DIAGNOSIS — Z00.00 MEDICARE ANNUAL WELLNESS VISIT, SUBSEQUENT: ICD-10-CM

## 2020-08-14 PROCEDURE — G8432 DEP SCR NOT DOC, RNG: HCPCS | Performed by: FAMILY MEDICINE

## 2020-08-14 PROCEDURE — G0439 PPPS, SUBSEQ VISIT: HCPCS | Performed by: FAMILY MEDICINE

## 2020-08-14 PROCEDURE — 99214 OFFICE O/P EST MOD 30 MIN: CPT | Performed by: FAMILY MEDICINE

## 2020-08-14 PROCEDURE — G8756 NO BP MEASURE DOC: HCPCS | Performed by: FAMILY MEDICINE

## 2020-08-14 PROCEDURE — G8399 PT W/DXA RESULTS DOCUMENT: HCPCS | Performed by: FAMILY MEDICINE

## 2020-08-14 PROCEDURE — G8427 DOCREV CUR MEDS BY ELIG CLIN: HCPCS | Performed by: FAMILY MEDICINE

## 2020-08-14 PROCEDURE — G8417 CALC BMI ABV UP PARAM F/U: HCPCS | Performed by: FAMILY MEDICINE

## 2020-08-14 PROCEDURE — 1100F PTFALLS ASSESS-DOCD GE2>/YR: CPT | Performed by: FAMILY MEDICINE

## 2020-08-14 PROCEDURE — 3288F FALL RISK ASSESSMENT DOCD: CPT | Performed by: FAMILY MEDICINE

## 2020-08-14 PROCEDURE — 1090F PRES/ABSN URINE INCON ASSESS: CPT | Performed by: FAMILY MEDICINE

## 2020-08-14 PROCEDURE — G8536 NO DOC ELDER MAL SCRN: HCPCS | Performed by: FAMILY MEDICINE

## 2020-08-14 RX ORDER — LEVOTHYROXINE SODIUM 112 UG/1
112 TABLET ORAL
Qty: 90 TAB | Refills: 3 | Status: SHIPPED | OUTPATIENT
Start: 2020-08-14

## 2020-08-14 RX ORDER — LOSARTAN POTASSIUM AND HYDROCHLOROTHIAZIDE 12.5; 1 MG/1; MG/1
TABLET ORAL
Qty: 90 TAB | Refills: 3 | Status: SHIPPED | OUTPATIENT
Start: 2020-08-14

## 2020-08-14 RX ORDER — HYDRALAZINE HYDROCHLORIDE 25 MG/1
TABLET, FILM COATED ORAL
Qty: 270 TAB | Refills: 3 | Status: SHIPPED | OUTPATIENT
Start: 2020-08-14

## 2020-08-14 RX ORDER — SERTRALINE HYDROCHLORIDE 100 MG/1
TABLET, FILM COATED ORAL
Qty: 90 TAB | Refills: 3 | Status: SHIPPED | OUTPATIENT
Start: 2020-08-14

## 2020-08-14 NOTE — PATIENT INSTRUCTIONS
Medicare Wellness Visit, Female The best way to live healthy is to have a lifestyle where you eat a well-balanced diet, exercise regularly, limit alcohol use, and quit all forms of tobacco/nicotine, if applicable. Regular preventive services are another way to keep healthy. Preventive services (vaccines, screening tests, monitoring & exams) can help personalize your care plan, which helps you manage your own care. Screening tests can find health problems at the earliest stages, when they are easiest to treat. [facility] follows the current, evidence-based guidelines published by the [facility] ([facility]) when recommending preventive services for our patients. Because we follow these guidelines, sometimes recommendations change over time as research supports it. (For example, mammograms used to be recommended annually. Even though Medicare will still pay for an annual mammogram, the newer guidelines recommend a mammogram every two years for women of average risk). Of course, you and your doctor may decide to screen more often for some diseases, based on your risk and your co-morbidities (chronic disease you are already diagnosed with). Preventive services for you include: - Medicare offers their members a free annual wellness visit, which is time for you and your primary care provider to discuss and plan for your preventive service needs. Take advantage of this benefit every year! 
-All adults over the age of 72 should receive the recommended pneumonia vaccines. Current USPSTF guidelines recommend a series of two vaccines for the best pneumonia protection.  
-All adults should have a flu vaccine yearly and a tetanus vaccine every 10 years.  
-All adults age 48 and older should receive the shingles vaccines (series of two vaccines). -All adults age 38-68 who are overweight should have a diabetes screening test once every three years. -All adults born between [birth year] and 1964 should be screened once for Hepatitis C. 
-Other screening tests and preventive services for persons with diabetes include: an eye exam to screen for diabetic retinopathy, a kidney function test, a foot exam, and stricter control over your cholesterol.  
-Cardiovascular screening for adults with routine risk involves an electrocardiogram (ECG) at intervals determined by your doctor.  
-Colorectal cancer screenings should be done for adults age 54-65 with no increased risk factors for colorectal cancer. There are a number of acceptable methods of screening for this type of cancer. Each test has its own benefits and drawbacks. Discuss with your doctor what is most appropriate for you during your annual wellness visit. The different tests include: colonoscopy (considered the best screening method), a fecal occult blood test, a fecal DNA test, and sigmoidoscopy. 
 
-A bone mass density test is recommended when a woman turns 65 to screen for osteoporosis. This test is only recommended one time, as a screening. Some providers will use this same test as a disease monitoring tool if you already have osteoporosis. -Breast cancer screenings are recommended every other year for women of normal risk, age 54-69. 
-Cervical cancer screenings for women over age 72 are only recommended with certain risk factors. Here is a list of your current Health Maintenance items (your personalized list of preventive services) with a due date: 
Health Maintenance Due Topic Date Due  
 Flu Vaccine  08/01/2020

## 2020-08-14 NOTE — PROGRESS NOTES
Luke Allen is a 67 y.o. female who presents for follow up. Reviewed labs.       Treated for HTN.    Blood pressure controlled. /68. Weight stable.      On lipitor 40mg once a day, no myalgias. LDL 71     Prediabetic 6%. Trying to follow diet.      Treated for hypothyroidism.  TSH at goal.      On sertraline, mood is stable  Taking lorazepam prn. Has some lower back pain when active. On lyrica 75mg at bedtime, sometimes during the day. Taking tylenol prn. Reports resting better.      No recent falls. DEXA scan osteopenia. Vitamin D normal 4 k daily. Mammogram Feb 2020 normal.      This is an established visit conducted via telemedicine, by phone. The patient has been instructed that this meets HIPAA criteria and acknowledges and agrees to this method of visitation. Pursuant to the emergency declaration under the [facility] waiver authority and the [employer] Act, this Virtual Visit was conducted, with patient's consent, to reduce the patient's risk of exposure to COVID-19 and provide continuity of care for an established patient. Services were provided by phone to substitute for in-person clinic visit.        Past Medical History:   Diagnosis Date    Anxiety     Chronic pain of both shoulders     Diverticulosis     GERD (gastroesophageal reflux disease)     H/O tubal ligation     Hyperlipidemia     Hypertension     Hypothyroidism     Menopause     Prediabetes     Vitamin D deficiency        Family History   Problem Relation Age of Onset    Heart Disease Mother     Stroke Mother     Hypertension Mother     Diabetes Mother     Elevated Lipids Mother     Diabetes Father     Heart Disease Father     Hypertension Father     Elevated Lipids Father     Cancer Sister 36        breast cancer    Sleep Apnea Sister     Elevated Lipids Sister     Breast Cancer Sister 36    Heart Surgery Brother     Heart Disease Brother     Cancer Maternal Grandmother         colon cancer    Stroke Maternal Grandfather     Diabetes Paternal Grandmother     Cancer Paternal Grandfather         pancreatic cancer       Social History     Socioeconomic History    Marital status:      Spouse name: Not on file    Number of children: Not on file    Years of education: Not on file    Highest education level: Not on file   Occupational History    Not on file   Social Needs    Financial resource strain: Not on file    Food insecurity     Worry: Not on file     Inability: Not on file   [employer] needs     Medical: Not on file     Non-medical: Not on file   Tobacco Use    Smoking status: Former Smoker     Packs/day: 1.00     Years: 30.00     Pack years: 30.00     Last attempt to quit: 3/23/2007     Years since quittin.4    Smokeless tobacco: Never Used   Substance and Sexual Activity    Alcohol use:  Yes     Alcohol/week: 1.0 standard drinks     Types: 1 Glasses of wine per week     Comment: Once a day    Drug use: No    Sexual activity: Not Currently     Partners: Male     Birth control/protection: None   Lifestyle    Physical activity     Days per week: Not on file     Minutes per session: Not on file    Stress: Not on file   Relationships    Social connections     Talks on phone: Not on file     Gets together: Not on file     Attends Mu-ism service: Not on file     Active member of club or organization: Not on file     Attends meetings of clubs or organizations: Not on file     Relationship status: Not on file    Intimate partner violence     Fear of current or ex partner: Not on file     Emotionally abused: Not on file     Physically abused: Not on file     Forced sexual activity: Not on file   Other Topics Concern    Not on file   Social History Narrative    Not on file       Current Outpatient Medications on File Prior to Visit   Medication Sig Dispense Refill    LORazepam (ATIVAN) 1 mg tablet Take one tablet at bedtime. 90 Tab 0    pantoprazole (PROTONIX) 40 mg tablet Take 1 Tab by mouth daily. TAKE ONE TABLET BY MOUTH DAILY 90 Tab 2    atorvastatin (LIPITOR) 40 mg tablet Take 1 Tab by mouth daily. 90 Tab 0    pregabalin (LYRICA) 75 mg capsule Take 1 Cap by mouth two (2) times a day. Max Daily Amount: 150 mg. 60 Cap 2    hydrALAZINE (APRESOLINE) 25 mg tablet TAKE ONE TABLET BY MOUTH THREE TIMES A  Tab 2    sertraline (ZOLOFT) 100 mg tablet TAKE ONE TABLET BY MOUTH DAILY 90 Tab 2    levothyroxine (SYNTHROID) 112 mcg tablet Take 1 Tab by mouth Daily (before breakfast). TAKE ONE TABLET BY MOUTH DAILY BEFORE BREAKFAST 90 Tab 2    losartan-hydroCHLOROthiazide (HYZAAR) 100-12.5 mg per tablet TAKE ONE TABLET BY MOUTH DAILY 90 Tab 2    cholecalciferol, VITAMIN D3, (VITAMIN D3) 5,000 unit tab tablet Take  by mouth daily.  aspirin 81 mg chewable tablet Take 81 mg by mouth daily.  docusate sodium (STOOL SOFTENER) 100 mg tab Take  by mouth. 2 tabs PO daily      magnesium oxide (MAG-OX) 400 mg tablet Take 400 mg by mouth daily.  cyanocobalamin (VITAMIN B-12) 1,000 mcg tablet Take 1,000 mcg by mouth daily. No current facility-administered medications on file prior to visit. Review of Systems  Pertinent items are noted in HPI. Objective:     Gen: healthy sounding female  HEENT: no audible congestion, patient does not see oral erythema and sees MMM  Neck: patient does not feel enlarged or tender LAD or masses  Resp: normal respiratory effort, no audible wheezing. CV: patient does not feel palpitations or heart irregularity  Abd: patient does not feel abdominal tenderness or mass, patient does not notice distension  Extrem: patient does not see swelling in ankles or joints.    Neuro: Alert and oriented, able to answer questions without difficulty, patient reports able to move all extremities and walk normally        Assessment/Plan: ICD-10-CM ICD-9-CM    1. Anxiety  F41.9 300.00    2. Pure hypercholesterolemia  E78.00 272.0    3. Myofascial pain  M79.18 729.1    4. Acquired hypothyroidism  E03.9 244.9    5. Essential hypertension  I10 401.9    6. Vitamin D deficiency  E55.9 268.9    7. Medicare annual wellness visit, subsequent  Z00.00 V70.0    8. Screening for depression  Z13.31 V79.0 Betty Bateman       This was a telemedicine visit by phone, duration 12 minutes. Lynette Kaur MD    Follow-up and Dispositions    · Return in about 6 months (around 2/14/2021) for follow up on medications. Alejandro Rucker

## 2020-08-14 NOTE — PROGRESS NOTES
This is the Subsequent Medicare Annual Wellness Exam, performed 12 months or more after the Initial AWV or the last Subsequent AWV    I have reviewed the patient's medical history in detail and updated the computerized patient record. History     Patient Active Problem List   Diagnosis Code    Optic neuropathy, right H46.9    Essential hypertension I10    Pure hypercholesterolemia E78.00    Acquired hypothyroidism E03.9    Osteopenia M85.80    Elevated glucose R73.09    Gastroesophageal reflux disease without esophagitis K21.9    Prediabetes R73.03    CKD (chronic kidney disease) stage 3, GFR 30-59 ml/min ([facility]) N18.3     Past Medical History:   Diagnosis Date    Anxiety     Chronic pain of both shoulders     Diverticulosis     GERD (gastroesophageal reflux disease)     H/O tubal ligation     Hyperlipidemia     Hypertension     Hypothyroidism     Menopause     Prediabetes     Vitamin D deficiency       Past Surgical History:   Procedure Laterality Date    HX APPENDECTOMY      HX BREAST AUGMENTATION  01/1993    HX BREAST BIOPSY Bilateral     right Breast 1 year and left breast 4 years ago.  HX DILATION AND CURETTAGE  1971-1976    x 3 due to MAB    HX HEENT      HX HEMORRHOIDECTOMY      banded    HX INTRACRANIAL ANEURYSM REPAIR  11/1997    brain aneurysm clipping, [facility]    HX TONSIL AND ADENOIDECTOMY      IMPLANT BREAST SILICONE/EQ Bilateral     Saline implants age 49     Current Outpatient Medications   Medication Sig Dispense Refill    LORazepam (ATIVAN) 1 mg tablet Take one tablet at bedtime. 90 Tab 0    pantoprazole (PROTONIX) 40 mg tablet Take 1 Tab by mouth daily. TAKE ONE TABLET BY MOUTH DAILY 90 Tab 2    atorvastatin (LIPITOR) 40 mg tablet Take 1 Tab by mouth daily. 90 Tab 0    pregabalin (LYRICA) 75 mg capsule Take 1 Cap by mouth two (2) times a day.  Max Daily Amount: 150 mg. 60 Cap 2    hydrALAZINE (APRESOLINE) 25 mg tablet TAKE ONE TABLET BY MOUTH THREE TIMES A  Tab 2    sertraline (ZOLOFT) 100 mg tablet TAKE ONE TABLET BY MOUTH DAILY 90 Tab 2    levothyroxine (SYNTHROID) 112 mcg tablet Take 1 Tab by mouth Daily (before breakfast). TAKE ONE TABLET BY MOUTH DAILY BEFORE BREAKFAST 90 Tab 2    losartan-hydroCHLOROthiazide (HYZAAR) 100-12.5 mg per tablet TAKE ONE TABLET BY MOUTH DAILY 90 Tab 2    cholecalciferol, VITAMIN D3, (VITAMIN D3) 5,000 unit tab tablet Take  by mouth daily.  aspirin 81 mg chewable tablet Take 81 mg by mouth daily.  docusate sodium (STOOL SOFTENER) 100 mg tab Take  by mouth. 2 tabs PO daily      magnesium oxide (MAG-OX) 400 mg tablet Take 400 mg by mouth daily.  cyanocobalamin (VITAMIN B-12) 1,000 mcg tablet Take 1,000 mcg by mouth daily. Allergies   Allergen Reactions    Adhesive Tape-Silicones Rash and Itching    Percocet [Oxycodone-Acetaminophen] Other (comments)     agitation       Family History   Problem Relation Age of Onset    Heart Disease Mother     Stroke Mother     Hypertension Mother     Diabetes Mother     Elevated Lipids Mother     Diabetes Father     Heart Disease Father     Hypertension Father    [facility] Elevated Lipids Father     Cancer Sister 36        breast cancer    Sleep Apnea Sister     Elevated Lipids Sister     Breast Cancer Sister 36    Heart Surgery Brother     Heart Disease Brother     Cancer Maternal Grandmother         colon cancer    Stroke Maternal Grandfather     Diabetes Paternal Grandmother     Cancer Paternal Grandfather         pancreatic cancer     Social History     Tobacco Use    Smoking status: Former Smoker     Packs/day: 1.00     Years: 30.00     Pack years: 30.00     Last attempt to quit: 3/23/2007     Years since quittin.4    Smokeless tobacco: Never Used   Substance Use Topics    Alcohol use:  Yes     Alcohol/week: 1.0 standard drinks     Types: 1 Glasses of wine per week     Comment: Once a day       Depression Risk Factor Screening:     3 most recent PHQ Screens 2/14/2020   Little interest or pleasure in doing things Not at all   Feeling down, depressed, irritable, or hopeless Not at all   Total Score PHQ 2 0       Alcohol Risk Factor Screening:   Do you average 1 drink per night or more than 7 drinks a week:  No    On any one occasion in the past three months have you have had more than 3 drinks containing alcohol:  No      Functional Ability and Level of Safety:   Hearing: Hearing is good. Activities of Daily Living: The home contains: no safety equipment. Patient does total self care     Ambulation: with no difficulty     Fall Risk:  Fall Risk Assessment, last 12 mths 2/14/2020   Able to walk? Yes   Fall in past 12 months? Yes   Fall with injury? Yes   Number of falls in past 12 months 1   Fall Risk Score 2     Abuse Screen:  Patient is not abused       Cognitive Screening   Has your family/caregiver stated any concerns about your memory: no    Cognitive Screening: Normal - Verbal Fluency Test    Patient Care Team   Patient Care Team:  Dilma Encarnacion MD as PCP - General (Internal Medicine)  Dilma Encarnacion MD as PCP - [facility] Empaneled Provider    Assessment/Plan   Education and counseling provided:  Are appropriate based on today's review and evaluation    Diagnoses and all orders for this visit:    1. Anxiety    2. Pure hypercholesterolemia    3. Myofascial pain    4. Acquired hypothyroidism    5. Essential hypertension    6. Vitamin D deficiency    7. Medicare annual wellness visit, subsequent    8. Screening for depression  -     [facility]   Topic Date Due    Influenza Age 6 to Adult  08/01/2020       Celina Salcedo, who was evaluated through a synchronous (real-time) audio only encounter, and/or her healthcare decision maker, is aware that it is a billable service, with coverage as determined by her insurance carrier. She provided verbal consent to proceed: Yes, and patient identification was verified.  It was conducted pursuant to the emergency declaration under the [address], [address] [place] [address] authority and the [employer] and [email or web address] General Act. A caregiver was present when appropriate. Ability to conduct physical exam was limited. I was at home. The patient was at home.     Keisha Kumar MD

## 2020-09-18 DIAGNOSIS — M79.18 MYOFASCIAL PAIN: ICD-10-CM

## 2020-09-18 DIAGNOSIS — E78.00 PURE HYPERCHOLESTEROLEMIA: ICD-10-CM

## 2020-09-18 RX ORDER — ATORVASTATIN CALCIUM 40 MG/1
40 TABLET, FILM COATED ORAL DAILY
Qty: 90 TAB | Refills: 0 | Status: SHIPPED | OUTPATIENT
Start: 2020-09-18 | End: 2020-12-06

## 2020-09-18 RX ORDER — PREGABALIN 75 MG/1
75 CAPSULE ORAL 2 TIMES DAILY
Qty: 60 CAP | Refills: 2 | Status: SHIPPED | OUTPATIENT
Start: 2020-09-18

## 2020-10-15 DIAGNOSIS — F41.9 ANXIETY: ICD-10-CM

## 2020-10-16 RX ORDER — LORAZEPAM 1 MG/1
TABLET ORAL
Qty: 90 TAB | Refills: 0 | Status: SHIPPED | OUTPATIENT
Start: 2020-10-16

## 2020-12-04 DIAGNOSIS — E78.00 PURE HYPERCHOLESTEROLEMIA: ICD-10-CM

## 2020-12-06 RX ORDER — ATORVASTATIN CALCIUM 40 MG/1
TABLET, FILM COATED ORAL
Qty: 90 TAB | Refills: 0 | Status: SHIPPED | OUTPATIENT
Start: 2020-12-06

## 2021-01-06 ENCOUNTER — TRANSCRIBE ORDER (OUTPATIENT)
Dept: SCHEDULING | Age: 81
End: 2021-01-06

## 2021-01-06 DIAGNOSIS — Z12.31 SCREENING MAMMOGRAM FOR HIGH-RISK PATIENT: Primary | ICD-10-CM

## 2021-03-25 ENCOUNTER — TRANSCRIBE ORDER (OUTPATIENT)
Dept: GENERAL RADIOLOGY | Age: 81
End: 2021-03-25

## 2021-03-25 ENCOUNTER — HOSPITAL ENCOUNTER (OUTPATIENT)
Dept: GENERAL RADIOLOGY | Age: 81
Discharge: HOME | End: 2021-03-25
Attending: FAMILY MEDICINE
Payer: MEDICARE

## 2021-03-25 DIAGNOSIS — M25.562 LEFT LATERAL KNEE PAIN: Primary | ICD-10-CM

## 2021-03-25 DIAGNOSIS — M25.562 LEFT LATERAL KNEE PAIN: ICD-10-CM

## 2021-03-25 PROCEDURE — 73562 X-RAY EXAM OF KNEE 3: CPT

## 2021-03-26 ENCOUNTER — TRANSCRIBE ORDER (OUTPATIENT)
Dept: GENERAL RADIOLOGY | Age: 81
End: 2021-03-26

## 2021-04-29 ENCOUNTER — TRANSCRIBE ORDER (OUTPATIENT)
Dept: SCHEDULING | Age: 81
End: 2021-04-29

## 2021-04-29 DIAGNOSIS — R22.32 LOCALIZED SWELLING, MASS AND LUMP, UPPER LIMB, LEFT: Primary | ICD-10-CM

## 2021-05-03 ENCOUNTER — HOSPITAL ENCOUNTER (OUTPATIENT)
Dept: MAMMOGRAPHY | Age: 81
Discharge: HOME | End: 2021-05-03
Attending: FAMILY MEDICINE
Payer: MEDICARE

## 2021-05-03 DIAGNOSIS — Z12.31 SCREENING MAMMOGRAM FOR HIGH-RISK PATIENT: ICD-10-CM

## 2021-05-03 PROCEDURE — 77063 BREAST TOMOSYNTHESIS BI: CPT

## 2021-05-04 ENCOUNTER — HOSPITAL ENCOUNTER (OUTPATIENT)
Dept: ULTRASOUND IMAGING | Age: 81
Discharge: HOME | End: 2021-05-04
Attending: FAMILY MEDICINE
Payer: MEDICARE

## 2021-05-04 DIAGNOSIS — R22.32 LOCALIZED SWELLING, MASS AND LUMP, UPPER LIMB, LEFT: ICD-10-CM

## 2021-05-04 PROCEDURE — 76881 US COMPL JOINT R-T W/IMG: CPT

## 2021-05-05 ENCOUNTER — HOSPITAL ENCOUNTER (OUTPATIENT)
Dept: MAMMOGRAPHY | Age: 81
Discharge: HOME | End: 2021-05-05

## 2021-05-05 ENCOUNTER — TRANSCRIBE ORDER (OUTPATIENT)
Dept: MAMMOGRAPHY | Age: 81
End: 2021-05-05

## 2021-05-05 DIAGNOSIS — N64.59 OTHER SIGNS AND SYMPTOMS IN BREAST: Primary | ICD-10-CM

## 2021-05-05 DIAGNOSIS — N64.59 OTHER SIGNS AND SYMPTOMS IN BREAST: ICD-10-CM

## 2022-01-05 ENCOUNTER — TRANSCRIBE ORDER (OUTPATIENT)
Dept: SCHEDULING | Age: 82
End: 2022-01-05

## 2022-01-05 DIAGNOSIS — Z12.31 ENCOUNTER FOR MAMMOGRAM TO ESTABLISH BASELINE MAMMOGRAM: Primary | ICD-10-CM

## 2022-01-10 ENCOUNTER — TRANSCRIBE ORDER (OUTPATIENT)
Dept: SCHEDULING | Age: 82
End: 2022-01-10

## 2022-01-10 DIAGNOSIS — M85.80 OSTEOPENIA: Primary | ICD-10-CM

## 2022-01-11 ENCOUNTER — TRANSCRIBE ORDER (OUTPATIENT)
Dept: SCHEDULING | Age: 82
End: 2022-01-11

## 2022-01-11 DIAGNOSIS — M85.89 OTHER SPECIFIED DISORDERS OF BONE DENSITY AND STRUCTURE, MULTIPLE SITES: Primary | ICD-10-CM

## 2022-01-11 DIAGNOSIS — M85.89 DISAPPEARING BONE DISEASE: Primary | ICD-10-CM

## 2022-01-28 ENCOUNTER — HOSPITAL ENCOUNTER (OUTPATIENT)
Dept: MAMMOGRAPHY | Age: 82
Discharge: HOME | End: 2022-01-28
Attending: FAMILY MEDICINE
Payer: MEDICARE

## 2022-01-28 DIAGNOSIS — M85.89 OTHER SPECIFIED DISORDERS OF BONE DENSITY AND STRUCTURE, MULTIPLE SITES: ICD-10-CM

## 2022-01-28 PROCEDURE — 77080 DXA BONE DENSITY AXIAL: CPT

## 2022-03-18 PROBLEM — R73.09 ELEVATED GLUCOSE: Status: ACTIVE | Noted: 2017-03-23

## 2022-03-18 PROBLEM — I10 ESSENTIAL HYPERTENSION: Status: ACTIVE | Noted: 2017-03-23

## 2022-03-18 PROBLEM — M85.80 OSTEOPENIA: Status: ACTIVE | Noted: 2017-03-23

## 2022-03-19 PROBLEM — H46.9 OPTIC NEUROPATHY, RIGHT: Status: ACTIVE | Noted: 2017-03-23

## 2022-03-19 PROBLEM — N18.30 CKD (CHRONIC KIDNEY DISEASE) STAGE 3, GFR 30-59 ML/MIN (HCC): Status: ACTIVE | Noted: 2019-08-18

## 2022-03-19 PROBLEM — E03.9 ACQUIRED HYPOTHYROIDISM: Status: ACTIVE | Noted: 2017-03-23

## 2022-03-19 PROBLEM — R73.03 PREDIABETES: Status: ACTIVE | Noted: 2019-02-14

## 2022-03-19 PROBLEM — K21.9 GASTROESOPHAGEAL REFLUX DISEASE WITHOUT ESOPHAGITIS: Status: ACTIVE | Noted: 2017-11-20

## 2022-03-19 PROBLEM — E78.00 PURE HYPERCHOLESTEROLEMIA: Status: ACTIVE | Noted: 2017-03-23

## 2022-06-15 ENCOUNTER — HOSPITAL ENCOUNTER (OUTPATIENT)
Dept: MAMMOGRAPHY | Age: 82
Discharge: HOME | End: 2022-06-15
Attending: FAMILY MEDICINE
Payer: MEDICARE

## 2022-06-15 DIAGNOSIS — Z12.31 ENCOUNTER FOR MAMMOGRAM TO ESTABLISH BASELINE MAMMOGRAM: ICD-10-CM

## 2022-06-15 PROCEDURE — 77063 BREAST TOMOSYNTHESIS BI: CPT

## 2023-07-03 ENCOUNTER — HOSPITAL ENCOUNTER (OUTPATIENT)
Age: 83
Discharge: HOME | End: 2023-07-06

## 2023-07-03 DIAGNOSIS — Z13.6 SCREENING FOR HEART DISEASE: ICD-10-CM

## 2023-07-05 LAB
VAS AORTA DIST AP: 1.6 CM
VAS AORTA MID AP: 1.7 CM
VAS AORTA PROX AP: 1.9 CM
VAS LEFT ABI: 1.11
VAS LEFT ARM BP: 160 MMHG
VAS LEFT DORSALIS PEDIS BP: 178 MMHG
VAS LEFT ICA/CCA PSV: 1.1
VAS LEFT PTA BP: 178 MMHG
VAS RIGHT ABI: 1.13
VAS RIGHT ARM BP: 158 MMHG
VAS RIGHT DORSALIS PEDIS BP: 180 MMHG
VAS RIGHT ICA/CCA PSV: 1
VAS RIGHT PTA BP: 172 MMHG

## 2023-07-11 ENCOUNTER — HOSPITAL ENCOUNTER (OUTPATIENT)
Facility: HOSPITAL | Age: 83
Discharge: HOME | End: 2023-07-14
Payer: MEDICARE

## 2023-07-11 DIAGNOSIS — Z12.31 SCREENING MAMMOGRAM FOR HIGH-RISK PATIENT: ICD-10-CM

## 2023-07-11 PROCEDURE — 77063 BREAST TOMOSYNTHESIS BI: CPT

## 2024-02-06 ENCOUNTER — HOSPITAL ENCOUNTER (OUTPATIENT)
Facility: HOSPITAL | Age: 84
Discharge: HOME | End: 2024-02-09
Attending: FAMILY MEDICINE

## 2024-02-06 DIAGNOSIS — E78.5 HYPERLIPIDEMIA, UNSPECIFIED HYPERLIPIDEMIA TYPE: ICD-10-CM

## 2024-02-06 PROCEDURE — 75571 CT HRT W/O DYE W/CA TEST: CPT

## 2024-03-20 ENCOUNTER — TRANSCRIBE ORDERS (OUTPATIENT)
Facility: HOSPITAL | Age: 84
End: 2024-03-20

## 2024-03-20 DIAGNOSIS — Z12.31 VISIT FOR SCREENING MAMMOGRAM: Primary | ICD-10-CM

## 2024-04-18 ENCOUNTER — TRANSCRIBE ORDERS (OUTPATIENT)
Facility: HOSPITAL | Age: 84
End: 2024-04-18

## 2024-04-18 DIAGNOSIS — I25.118 CORONARY ARTERY DISEASE INVOLVING NATIVE CORONARY ARTERY OF NATIVE HEART WITH OTHER FORM OF ANGINA PECTORIS (HCC): Primary | ICD-10-CM

## 2024-04-18 DIAGNOSIS — I35.9 AORTIC VALVULAR DISEASE: ICD-10-CM

## 2024-04-25 ENCOUNTER — HOSPITAL ENCOUNTER (OUTPATIENT)
Facility: HOSPITAL | Age: 84
End: 2024-04-25
Attending: NUCLEAR MEDICINE
Payer: MEDICARE

## 2024-04-25 ENCOUNTER — HOSPITAL ENCOUNTER (OUTPATIENT)
Facility: HOSPITAL | Age: 84
Discharge: HOME | End: 2024-04-27
Attending: NUCLEAR MEDICINE
Payer: MEDICARE

## 2024-04-25 DIAGNOSIS — I35.9 AORTIC VALVULAR DISEASE: ICD-10-CM

## 2024-04-25 DIAGNOSIS — I25.118 CORONARY ARTERY DISEASE INVOLVING NATIVE CORONARY ARTERY OF NATIVE HEART WITH OTHER FORM OF ANGINA PECTORIS (HCC): ICD-10-CM

## 2024-04-25 LAB
ECHO AO ROOT DIAM: 3.7 CM
ECHO AV AREA PEAK VELOCITY: 2.4 CM2
ECHO AV AREA VTI: 2.5 CM2
ECHO AV CUSP MM: 1.9 CM
ECHO AV MEAN GRADIENT: 4 MMHG
ECHO AV MEAN VELOCITY: 1 M/S
ECHO AV PEAK GRADIENT: 9 MMHG
ECHO AV PEAK GRADIENT: 9 MMHG
ECHO AV PEAK VELOCITY: 1.5 M/S
ECHO AV PEAK VELOCITY: 1.5 M/S
ECHO AV VTI: 30.6 CM
ECHO LA DIAMETER: 3.6 CM
ECHO LA TO AORTIC ROOT RATIO: 0.97
ECHO LA VOL A-L A2C: 28 ML (ref 22–52)
ECHO LA VOL A-L A4C: 77 ML (ref 22–52)
ECHO LA VOL MOD A2C: 28 ML (ref 22–52)
ECHO LA VOL MOD A4C: 72 ML (ref 22–52)
ECHO LA VOLUME AREA LENGTH: 51 ML
ECHO LV E' LATERAL VELOCITY: 5 CM/S
ECHO LV E' SEPTAL VELOCITY: 5 CM/S
ECHO LV FRACTIONAL SHORTENING: 35 % (ref 28–44)
ECHO LV INTERNAL DIMENSION DIASTOLIC: 4.6 CM (ref 3.9–5.3)
ECHO LV INTERNAL DIMENSION SYSTOLIC: 3 CM
ECHO LV IVSD: 1.1 CM (ref 0.6–0.9)
ECHO LV MASS 2D: 181.2 G (ref 67–162)
ECHO LV POSTERIOR WALL DIASTOLIC: 1.1 CM (ref 0.6–0.9)
ECHO LV RELATIVE WALL THICKNESS RATIO: 0.48
ECHO LVOT AREA: 3.1 CM2
ECHO LVOT AV VTI INDEX: 0.83
ECHO LVOT DIAM: 2 CM
ECHO LVOT MEAN GRADIENT: 2 MMHG
ECHO LVOT PEAK GRADIENT: 5 MMHG
ECHO LVOT PEAK GRADIENT: 5 MMHG
ECHO LVOT PEAK VELOCITY: 1.2 M/S
ECHO LVOT PEAK VELOCITY: 1.2 M/S
ECHO LVOT SV: 79.4 ML
ECHO LVOT VTI: 25.3 CM
ECHO MV A VELOCITY: 1.15 M/S
ECHO MV AREA VTI: 2.5 CM2
ECHO MV E DECELERATION TIME (DT): 223.2 MS
ECHO MV E VELOCITY: 0.73 M/S
ECHO MV E/A RATIO: 0.63
ECHO MV E/E' LATERAL: 14.6
ECHO MV E/E' RATIO (AVERAGED): 14.6
ECHO MV LVOT VTI INDEX: 1.27
ECHO MV MAX VELOCITY: 1.4 M/S
ECHO MV MEAN GRADIENT: 2 MMHG
ECHO MV MEAN VELOCITY: 0.7 M/S
ECHO MV PEAK GRADIENT: 8 MMHG
ECHO MV VTI: 32.1 CM
ECHO RV FREE WALL PEAK S': 17 CM/S
ECHO TV REGURGITANT MAX VELOCITY: 2.42 M/S
ECHO TV REGURGITANT PEAK GRADIENT: 23 MMHG

## 2024-04-25 PROCEDURE — A9500 TC99M SESTAMIBI: HCPCS | Performed by: NUCLEAR MEDICINE

## 2024-04-25 PROCEDURE — 3430000000 HC RX DIAGNOSTIC RADIOPHARMACEUTICAL: Performed by: NUCLEAR MEDICINE

## 2024-04-25 PROCEDURE — 6360000002 HC RX W HCPCS: Performed by: INTERNAL MEDICINE

## 2024-04-25 PROCEDURE — 93306 TTE W/DOPPLER COMPLETE: CPT

## 2024-04-25 PROCEDURE — 93017 CV STRESS TEST TRACING ONLY: CPT

## 2024-04-25 PROCEDURE — 78452 HT MUSCLE IMAGE SPECT MULT: CPT

## 2024-04-25 RX ORDER — REGADENOSON 0.08 MG/ML
0.4 INJECTION, SOLUTION INTRAVENOUS
Status: COMPLETED | OUTPATIENT
Start: 2024-04-25 | End: 2024-04-25

## 2024-04-25 RX ORDER — TETRAKIS(2-METHOXYISOBUTYLISOCYANIDE)COPPER(I) TETRAFLUOROBORATE 1 MG/ML
11 INJECTION, POWDER, LYOPHILIZED, FOR SOLUTION INTRAVENOUS
Status: COMPLETED | OUTPATIENT
Start: 2024-04-25 | End: 2024-04-25

## 2024-04-25 RX ORDER — TETRAKIS(2-METHOXYISOBUTYLISOCYANIDE)COPPER(I) TETRAFLUOROBORATE 1 MG/ML
32.6 INJECTION, POWDER, LYOPHILIZED, FOR SOLUTION INTRAVENOUS
Status: COMPLETED | OUTPATIENT
Start: 2024-04-25 | End: 2024-04-25

## 2024-04-25 RX ORDER — IPRATROPIUM BROMIDE 42 UG/1
2 SPRAY, METERED NASAL 4 TIMES DAILY
COMMUNITY

## 2024-04-25 RX ADMIN — REGADENOSON 0.4 MG: 0.08 INJECTION, SOLUTION INTRAVENOUS at 09:57

## 2024-04-25 RX ADMIN — TETRAKIS(2-METHOXYISOBUTYLISOCYANIDE)COPPER(I) TETRAFLUOROBORATE 32.6 MILLICURIE: 1 INJECTION, POWDER, LYOPHILIZED, FOR SOLUTION INTRAVENOUS at 10:00

## 2024-04-25 RX ADMIN — TETRAKIS(2-METHOXYISOBUTYLISOCYANIDE)COPPER(I) TETRAFLUOROBORATE 11 MILLICURIE: 1 INJECTION, POWDER, LYOPHILIZED, FOR SOLUTION INTRAVENOUS at 08:00

## 2024-04-26 LAB
EKG DIAGNOSIS: NORMAL
STRESS BASELINE DIAS BP: 80 MMHG
STRESS BASELINE HR: 63 BPM
STRESS BASELINE SYS BP: 152 MMHG
STRESS ESTIMATED WORKLOAD: 1 METS
STRESS O2 SAT PEAK: 95 %
STRESS O2 SAT REST: 95 %
STRESS PEAK DIAS BP: 79 MMHG
STRESS PEAK SYS BP: 155 MMHG
STRESS PERCENT HR ACHIEVED: 59 %
STRESS POST PEAK HR: 82 BPM
STRESS RATE PRESSURE PRODUCT: NORMAL BPM*MMHG
STRESS TARGET HR: 138 BPM

## 2024-07-15 ENCOUNTER — HOSPITAL ENCOUNTER (OUTPATIENT)
Facility: HOSPITAL | Age: 84
Discharge: HOME | End: 2024-07-18
Attending: FAMILY MEDICINE
Payer: MEDICARE

## 2024-07-15 VITALS — HEIGHT: 63 IN | WEIGHT: 177 LBS | BODY MASS INDEX: 31.36 KG/M2

## 2024-07-15 DIAGNOSIS — Z12.31 VISIT FOR SCREENING MAMMOGRAM: ICD-10-CM

## 2024-07-15 PROCEDURE — 77063 BREAST TOMOSYNTHESIS BI: CPT

## 2024-10-08 ENCOUNTER — HOSPITAL ENCOUNTER (OUTPATIENT)
Facility: HOSPITAL | Age: 84
Discharge: HOME | End: 2024-10-11
Attending: FAMILY MEDICINE
Payer: MEDICARE

## 2024-10-08 VITALS — BODY MASS INDEX: 31.83 KG/M2 | HEIGHT: 62 IN | WEIGHT: 173 LBS

## 2024-10-08 DIAGNOSIS — M85.89 OTHER SPECIFIED DISORDERS OF BONE DENSITY AND STRUCTURE, MULTIPLE SITES: ICD-10-CM

## 2024-10-08 PROCEDURE — 77080 DXA BONE DENSITY AXIAL: CPT

## 2025-04-11 ENCOUNTER — TRANSCRIBE ORDERS (OUTPATIENT)
Facility: HOSPITAL | Age: 85
End: 2025-04-11

## 2025-04-11 DIAGNOSIS — Z12.31 VISIT FOR SCREENING MAMMOGRAM: Primary | ICD-10-CM

## 2025-07-16 ENCOUNTER — HOSPITAL ENCOUNTER (OUTPATIENT)
Facility: HOSPITAL | Age: 85
Discharge: HOME | End: 2025-07-19
Attending: FAMILY MEDICINE
Payer: MEDICARE

## 2025-07-16 VITALS — BODY MASS INDEX: 31.83 KG/M2 | HEIGHT: 62 IN | WEIGHT: 173 LBS

## 2025-07-16 DIAGNOSIS — Z12.31 VISIT FOR SCREENING MAMMOGRAM: ICD-10-CM

## 2025-07-16 PROCEDURE — 77063 BREAST TOMOSYNTHESIS BI: CPT
